# Patient Record
Sex: MALE | Race: WHITE | NOT HISPANIC OR LATINO | Employment: FULL TIME | URBAN - METROPOLITAN AREA
[De-identification: names, ages, dates, MRNs, and addresses within clinical notes are randomized per-mention and may not be internally consistent; named-entity substitution may affect disease eponyms.]

---

## 2020-09-08 ENCOUNTER — HOSPITAL ENCOUNTER (OUTPATIENT)
Dept: GENERAL RADIOLOGY | Facility: HOSPITAL | Age: 50
Discharge: HOME | End: 2020-09-08
Attending: PHYSICIAN ASSISTANT

## 2022-05-17 ENCOUNTER — PREP FOR SURGERY (OUTPATIENT)
Dept: OTHER | Facility: HOSPITAL | Age: 52
End: 2022-05-17

## 2022-05-17 ENCOUNTER — OFFICE VISIT (OUTPATIENT)
Dept: SURGERY | Facility: CLINIC | Age: 52
End: 2022-05-17

## 2022-05-17 VITALS — HEIGHT: 67 IN | BODY MASS INDEX: 28.88 KG/M2 | RESPIRATION RATE: 18 BRPM | HEART RATE: 91 BPM | WEIGHT: 184 LBS

## 2022-05-17 DIAGNOSIS — R19.4 CHANGE IN BOWEL HABITS: ICD-10-CM

## 2022-05-17 DIAGNOSIS — K62.5 BRBPR (BRIGHT RED BLOOD PER RECTUM): Primary | ICD-10-CM

## 2022-05-17 PROCEDURE — 99203 OFFICE O/P NEW LOW 30 MIN: CPT | Performed by: NURSE PRACTITIONER

## 2022-05-17 RX ORDER — SODIUM CHLORIDE 0.9 % (FLUSH) 0.9 %
3 SYRINGE (ML) INJECTION EVERY 12 HOURS SCHEDULED
Status: CANCELLED | OUTPATIENT
Start: 2022-05-17

## 2022-05-17 RX ORDER — POLYETHYLENE GLYCOL 3350 17 G/17G
POWDER, FOR SOLUTION ORAL
Qty: 238 PACKET | Refills: 0 | Status: ON HOLD | OUTPATIENT
Start: 2022-05-17 | End: 2022-08-19

## 2022-05-17 RX ORDER — SODIUM CHLORIDE 0.9 % (FLUSH) 0.9 %
10 SYRINGE (ML) INJECTION AS NEEDED
Status: CANCELLED | OUTPATIENT
Start: 2022-05-17

## 2022-05-17 RX ORDER — OMEPRAZOLE 40 MG/1
40 CAPSULE, DELAYED RELEASE ORAL DAILY
COMMUNITY
Start: 2022-04-02

## 2022-08-19 ENCOUNTER — HOSPITAL ENCOUNTER (OUTPATIENT)
Facility: HOSPITAL | Age: 52
Setting detail: HOSPITAL OUTPATIENT SURGERY
Discharge: HOME | End: 2022-08-19
Attending: SURGERY | Admitting: SURGERY

## 2022-08-19 ENCOUNTER — ANESTHESIA (OUTPATIENT)
Dept: GASTROENTEROLOGY | Facility: HOSPITAL | Age: 52
End: 2022-08-19

## 2022-08-19 ENCOUNTER — ANESTHESIA EVENT (OUTPATIENT)
Dept: GASTROENTEROLOGY | Facility: HOSPITAL | Age: 52
End: 2022-08-19

## 2022-08-19 VITALS
DIASTOLIC BLOOD PRESSURE: 69 MMHG | HEIGHT: 68 IN | WEIGHT: 181.22 LBS | RESPIRATION RATE: 16 BRPM | HEART RATE: 70 BPM | TEMPERATURE: 96.9 F | OXYGEN SATURATION: 97 % | SYSTOLIC BLOOD PRESSURE: 105 MMHG | BODY MASS INDEX: 27.47 KG/M2

## 2022-08-19 DIAGNOSIS — K62.5 BRBPR (BRIGHT RED BLOOD PER RECTUM): ICD-10-CM

## 2022-08-19 DIAGNOSIS — R19.4 CHANGE IN BOWEL HABITS: ICD-10-CM

## 2022-08-19 PROCEDURE — 88305 TISSUE EXAM BY PATHOLOGIST: CPT | Performed by: SURGERY

## 2022-08-19 PROCEDURE — 25010000002 PROPOFOL 10 MG/ML EMULSION: Performed by: NURSE ANESTHETIST, CERTIFIED REGISTERED

## 2022-08-19 RX ORDER — SODIUM CHLORIDE 0.9 % (FLUSH) 0.9 %
3 SYRINGE (ML) INJECTION EVERY 12 HOURS SCHEDULED
Status: DISCONTINUED | OUTPATIENT
Start: 2022-08-19 | End: 2022-08-19 | Stop reason: HOSPADM

## 2022-08-19 RX ORDER — DEXMEDETOMIDINE HYDROCHLORIDE 100 UG/ML
INJECTION, SOLUTION INTRAVENOUS AS NEEDED
Status: DISCONTINUED | OUTPATIENT
Start: 2022-08-19 | End: 2022-08-19 | Stop reason: SURG

## 2022-08-19 RX ORDER — SODIUM CHLORIDE 0.9 % (FLUSH) 0.9 %
10 SYRINGE (ML) INJECTION AS NEEDED
Status: DISCONTINUED | OUTPATIENT
Start: 2022-08-19 | End: 2022-08-19 | Stop reason: HOSPADM

## 2022-08-19 RX ORDER — SODIUM CHLORIDE, SODIUM LACTATE, POTASSIUM CHLORIDE, CALCIUM CHLORIDE 600; 310; 30; 20 MG/100ML; MG/100ML; MG/100ML; MG/100ML
30 INJECTION, SOLUTION INTRAVENOUS CONTINUOUS
Status: DISCONTINUED | OUTPATIENT
Start: 2022-08-19 | End: 2022-08-19 | Stop reason: HOSPADM

## 2022-08-19 RX ORDER — LIDOCAINE HYDROCHLORIDE 20 MG/ML
INJECTION, SOLUTION EPIDURAL; INFILTRATION; INTRACAUDAL; PERINEURAL AS NEEDED
Status: DISCONTINUED | OUTPATIENT
Start: 2022-08-19 | End: 2022-08-19 | Stop reason: SURG

## 2022-08-19 RX ORDER — PROPOFOL 10 MG/ML
VIAL (ML) INTRAVENOUS AS NEEDED
Status: DISCONTINUED | OUTPATIENT
Start: 2022-08-19 | End: 2022-08-19 | Stop reason: SURG

## 2022-08-19 RX ADMIN — PROPOFOL 200 MCG/KG/MIN: 10 INJECTION, EMULSION INTRAVENOUS at 09:17

## 2022-08-19 RX ADMIN — LIDOCAINE HYDROCHLORIDE 100 MG: 20 INJECTION, SOLUTION EPIDURAL; INFILTRATION; INTRACAUDAL; PERINEURAL at 09:17

## 2022-08-19 RX ADMIN — DEXMEDETOMIDINE HYDROCHLORIDE 20 MCG: 100 INJECTION, SOLUTION, CONCENTRATE INTRAVENOUS at 09:24

## 2022-08-19 RX ADMIN — PROPOFOL 250 MCG/KG/MIN: 10 INJECTION, EMULSION INTRAVENOUS at 09:30

## 2022-08-19 RX ADMIN — PROPOFOL 250 MG: 10 INJECTION, EMULSION INTRAVENOUS at 09:17

## 2022-08-19 RX ADMIN — SODIUM CHLORIDE, POTASSIUM CHLORIDE, SODIUM LACTATE AND CALCIUM CHLORIDE 30 ML/HR: 600; 310; 30; 20 INJECTION, SOLUTION INTRAVENOUS at 08:22

## 2022-08-22 LAB
CYTO UR: NORMAL
LAB AP CASE REPORT: NORMAL
LAB AP CLINICAL INFORMATION: NORMAL
PATH REPORT.FINAL DX SPEC: NORMAL
PATH REPORT.GROSS SPEC: NORMAL

## 2022-08-26 ENCOUNTER — TELEPHONE (OUTPATIENT)
Dept: SURGERY | Facility: CLINIC | Age: 52
End: 2022-08-26

## 2023-09-25 ENCOUNTER — TRANSCRIBE ORDERS (OUTPATIENT)
Dept: DIABETES SERVICES | Facility: HOSPITAL | Age: 53
End: 2023-09-25

## 2023-09-25 DIAGNOSIS — E11.65 TYPE 2 DIABETES MELLITUS WITH HYPERGLYCEMIA, WITHOUT LONG-TERM CURRENT USE OF INSULIN: Primary | ICD-10-CM

## 2025-06-03 ENCOUNTER — APPOINTMENT (OUTPATIENT)
Dept: GENERAL RADIOLOGY | Facility: HOSPITAL | Age: 55
End: 2025-06-03
Payer: OTHER GOVERNMENT

## 2025-06-03 ENCOUNTER — APPOINTMENT (OUTPATIENT)
Dept: CARDIOLOGY | Facility: HOSPITAL | Age: 55
End: 2025-06-03
Payer: OTHER GOVERNMENT

## 2025-06-03 ENCOUNTER — APPOINTMENT (OUTPATIENT)
Dept: CT IMAGING | Facility: HOSPITAL | Age: 55
End: 2025-06-03
Payer: OTHER GOVERNMENT

## 2025-06-03 ENCOUNTER — HOSPITAL ENCOUNTER (INPATIENT)
Facility: HOSPITAL | Age: 55
LOS: 7 days | Discharge: HOME | End: 2025-06-10
Attending: EMERGENCY MEDICINE | Admitting: STUDENT IN AN ORGANIZED HEALTH CARE EDUCATION/TRAINING PROGRAM
Payer: OTHER GOVERNMENT

## 2025-06-03 DIAGNOSIS — J96.01 ACUTE HYPOXIC RESPIRATORY FAILURE: ICD-10-CM

## 2025-06-03 DIAGNOSIS — I21.9 TYPE 1 MYOCARDIAL INFARCTION: ICD-10-CM

## 2025-06-03 DIAGNOSIS — I21.4 NSTEMI (NON-ST ELEVATED MYOCARDIAL INFARCTION): ICD-10-CM

## 2025-06-03 DIAGNOSIS — R26.2 DIFFICULTY WALKING: ICD-10-CM

## 2025-06-03 DIAGNOSIS — I50.9 ACUTE ON CHRONIC CONGESTIVE HEART FAILURE, UNSPECIFIED HEART FAILURE TYPE: ICD-10-CM

## 2025-06-03 DIAGNOSIS — I50.21 ACUTE SYSTOLIC CONGESTIVE HEART FAILURE: Primary | ICD-10-CM

## 2025-06-03 PROBLEM — I50.31 ACUTE DIASTOLIC CONGESTIVE HEART FAILURE: Status: ACTIVE | Noted: 2025-06-03

## 2025-06-03 LAB
ALBUMIN SERPL-MCNC: 4.5 G/DL (ref 3.5–5.2)
ALBUMIN/GLOB SERPL: 1.5 G/DL
ALP SERPL-CCNC: 109 U/L (ref 39–117)
ALT SERPL W P-5'-P-CCNC: 14 U/L (ref 1–41)
ANION GAP SERPL CALCULATED.3IONS-SCNC: 17.6 MMOL/L (ref 5–15)
ANISOCYTOSIS BLD QL: NORMAL
AORTIC DIMENSIONLESS INDEX: 0.6 (DI)
APTT PPP: 110.9 SECONDS (ref 78–95.9)
APTT PPP: 37.2 SECONDS (ref 78–95.9)
APTT PPP: 56.6 SECONDS (ref 78–95.9)
APTT PPP: 69.6 SECONDS (ref 78–95.9)
APTT PPP: 80.7 SECONDS (ref 78–95.9)
ARTERIAL PATENCY WRIST A: POSITIVE
AST SERPL-CCNC: 22 U/L (ref 1–40)
ATMOSPHERIC PRESS: 743.7 MMHG
AV MEAN PRESS GRAD SYS DOP V1V2: 6 MMHG
AV VMAX SYS DOP: 163.6 CM/SEC
BASE EXCESS BLDA CALC-SCNC: -7 MMOL/L (ref -2–2)
BASOPHILS # BLD AUTO: 0.09 10*3/MM3 (ref 0–0.2)
BASOPHILS NFR BLD AUTO: 0.6 % (ref 0–1.5)
BDY SITE: ABNORMAL
BH CV ECHO MEAS - AO MAX PG: 10.7 MMHG
BH CV ECHO MEAS - AO ROOT DIAM: 2.9 CM
BH CV ECHO MEAS - AO V2 VTI: 26.6 CM
BH CV ECHO MEAS - AVA(I,D): 1.87 CM2
BH CV ECHO MEAS - EDV(MOD-SP2): 209.3 ML
BH CV ECHO MEAS - EDV(MOD-SP4): 178.7 ML
BH CV ECHO MEAS - EF(MOD-SP2): 29.7 %
BH CV ECHO MEAS - EF(MOD-SP4): 18.8 %
BH CV ECHO MEAS - ESV(MOD-SP2): 147.2 ML
BH CV ECHO MEAS - ESV(MOD-SP4): 145.1 ML
BH CV ECHO MEAS - IVS/LVPW: 0.91 CM
BH CV ECHO MEAS - IVSD: 1 CM
BH CV ECHO MEAS - LA DIMENSION: 4.9 CM
BH CV ECHO MEAS - LAT PEAK E' VEL: 11.3 CM/SEC
BH CV ECHO MEAS - LV DIASTOLIC VOL/BSA (35-75): 93.2 CM2
BH CV ECHO MEAS - LV MAX PG: 2.6 MMHG
BH CV ECHO MEAS - LV MEAN PG: 1.2 MMHG
BH CV ECHO MEAS - LV SYSTOLIC VOL/BSA (12-30): 75.6 CM2
BH CV ECHO MEAS - LV V1 MAX: 80 CM/SEC
BH CV ECHO MEAS - LV V1 VTI: 15.9 CM
BH CV ECHO MEAS - LVIDD: 5.9 CM
BH CV ECHO MEAS - LVIDS: 5.1 CM
BH CV ECHO MEAS - LVOT AREA: 3.1 CM2
BH CV ECHO MEAS - LVOT DIAM: 2 CM
BH CV ECHO MEAS - LVPWD: 1.1 CM
BH CV ECHO MEAS - MED PEAK E' VEL: 9.2 CM/SEC
BH CV ECHO MEAS - MR MAX PG: 85.7 MMHG
BH CV ECHO MEAS - MR MAX VEL: 463 CM/SEC
BH CV ECHO MEAS - MR MEAN PG: 55.8 MMHG
BH CV ECHO MEAS - MR VTI: 133.9 CM
BH CV ECHO MEAS - MV A MAX VEL: 66 CM/SEC
BH CV ECHO MEAS - MV DEC SLOPE: 677 CM/SEC2
BH CV ECHO MEAS - MV E MAX VEL: 133.1 CM/SEC
BH CV ECHO MEAS - MV E/A: 2.02
BH CV ECHO MEAS - MV MAX PG: 9.1 MMHG
BH CV ECHO MEAS - MV MEAN PG: 3.8 MMHG
BH CV ECHO MEAS - MV P1/2T: 66 MSEC
BH CV ECHO MEAS - MV V2 VTI: 36 CM
BH CV ECHO MEAS - MVA(P1/2T): 3.3 CM2
BH CV ECHO MEAS - MVA(VTI): 1.38 CM2
BH CV ECHO MEAS - RAP SYSTOLE: 3 MMHG
BH CV ECHO MEAS - RVDD: 2.7 CM
BH CV ECHO MEAS - RVSP: 24.7 MMHG
BH CV ECHO MEAS - SV(LVOT): 49.8 ML
BH CV ECHO MEAS - SV(MOD-SP2): 62.1 ML
BH CV ECHO MEAS - SV(MOD-SP4): 33.6 ML
BH CV ECHO MEAS - SVI(LVOT): 26 ML/M2
BH CV ECHO MEAS - SVI(MOD-SP2): 32.4 ML/M2
BH CV ECHO MEAS - SVI(MOD-SP4): 17.5 ML/M2
BH CV ECHO MEAS - TR MAX PG: 21.7 MMHG
BH CV ECHO MEAS - TR MAX VEL: 232.7 CM/SEC
BH CV ECHO MEASUREMENTS AVERAGE E/E' RATIO: 12.99
BILIRUB SERPL-MCNC: 0.7 MG/DL (ref 0–1.2)
BUN SERPL-MCNC: 14.5 MG/DL (ref 6–20)
BUN/CREAT SERPL: 14.8 (ref 7–25)
CA-I BLDA-SCNC: 1.14 MMOL/L (ref 1.13–1.32)
CALCIUM SPEC-SCNC: 9.3 MG/DL (ref 8.6–10.5)
CHLORIDE BLDA-SCNC: 109 MMOL/L (ref 98–107)
CHLORIDE SERPL-SCNC: 103 MMOL/L (ref 98–107)
CHOLEST SERPL-MCNC: 123 MG/DL (ref 0–200)
CO2 SERPL-SCNC: 17.4 MMOL/L (ref 22–29)
CREAT SERPL-MCNC: 0.98 MG/DL (ref 0.76–1.27)
D-LACTATE SERPL-SCNC: 2 MMOL/L (ref 0.5–2)
D-LACTATE SERPL-SCNC: 2.5 MMOL/L (ref 0.5–2)
D-LACTATE SERPL-SCNC: 2.6 MMOL/L
DEPRECATED RDW RBC AUTO: 49.1 FL (ref 37–54)
EGFRCR SERPLBLD CKD-EPI 2021: 92.2 ML/MIN/1.73
ELLIPTOCYTES BLD QL SMEAR: NORMAL
EOSINOPHIL # BLD AUTO: 0.17 10*3/MM3 (ref 0–0.4)
EOSINOPHIL NFR BLD AUTO: 1.1 % (ref 0.3–6.2)
ERYTHROCYTE [DISTWIDTH] IN BLOOD BY AUTOMATED COUNT: 20.1 % (ref 12.3–15.4)
FLUAV RNA RESP QL NAA+PROBE: NOT DETECTED
FLUBV RNA RESP QL NAA+PROBE: NOT DETECTED
GEN 5 1HR TROPONIN T REFLEX: 264 NG/L
GLOBULIN UR ELPH-MCNC: 3 GM/DL
GLUCOSE BLDC GLUCOMTR-MCNC: 197 MG/DL (ref 70–99)
GLUCOSE BLDC GLUCOMTR-MCNC: 201 MG/DL (ref 70–99)
GLUCOSE BLDC GLUCOMTR-MCNC: 268 MG/DL (ref 70–99)
GLUCOSE BLDC GLUCOMTR-MCNC: 287 MG/DL (ref 70–99)
GLUCOSE SERPL-MCNC: 171 MG/DL (ref 65–99)
HBA1C MFR BLD: 5.6 % (ref 4.8–5.6)
HCO3 BLDA-SCNC: 17.5 MMOL/L (ref 22–26)
HCT VFR BLD AUTO: 35.6 % (ref 37.5–51)
HCT VFR BLD CALC: 33 % (ref 38–51)
HDLC SERPL-MCNC: 29 MG/DL (ref 40–60)
HEMODILUTION: NO
HGB BLD-MCNC: 10.2 G/DL (ref 13–17.7)
HGB BLDA-MCNC: 11.2 G/DL (ref 12–18)
HOLD SPECIMEN: NORMAL
IMM GRANULOCYTES # BLD AUTO: 0.06 10*3/MM3 (ref 0–0.05)
IMM GRANULOCYTES NFR BLD AUTO: 0.4 % (ref 0–0.5)
INHALED O2 CONCENTRATION: 35 %
INR PPP: 1.29 (ref 0.86–1.15)
LDLC SERPL CALC-MCNC: 72 MG/DL (ref 0–100)
LDLC/HDLC SERPL: 2.43 {RATIO}
LEFT ATRIUM VOLUME INDEX: 56.7 ML/M2
LV EF BIPLANE MOD: 25 %
LYMPHOCYTES # BLD AUTO: 2.92 10*3/MM3 (ref 0.7–3.1)
LYMPHOCYTES NFR BLD AUTO: 18.4 % (ref 19.6–45.3)
MCH RBC QN AUTO: 20 PG (ref 26.6–33)
MCHC RBC AUTO-ENTMCNC: 28.7 G/DL (ref 31.5–35.7)
MCV RBC AUTO: 69.8 FL (ref 79–97)
MICROCYTES BLD QL: NORMAL
MODALITY: ABNORMAL
MONOCYTES # BLD AUTO: 0.89 10*3/MM3 (ref 0.1–0.9)
MONOCYTES NFR BLD AUTO: 5.6 % (ref 5–12)
NEUTROPHILS NFR BLD AUTO: 11.78 10*3/MM3 (ref 1.7–7)
NEUTROPHILS NFR BLD AUTO: 73.9 % (ref 42.7–76)
NRBC BLD AUTO-RTO: 0 /100 WBC (ref 0–0.2)
NT-PROBNP SERPL-MCNC: 3045 PG/ML (ref 0–900)
OVALOCYTES BLD QL SMEAR: NORMAL
PCO2 BLDA: 31.3 MM HG (ref 35–45)
PEEP RESPIRATORY: 8 CM[H2O]
PH BLDA: 7.36 PH UNITS (ref 7.35–7.45)
PLATELET # BLD AUTO: 224 10*3/MM3 (ref 140–450)
PMV BLD AUTO: ABNORMAL FL
PO2 BLD: 306 MM[HG] (ref 0–500)
PO2 BLDA: 107 MM HG (ref 80–100)
POTASSIUM BLDA-SCNC: 3.4 MMOL/L (ref 3.5–5)
POTASSIUM SERPL-SCNC: 3.8 MMOL/L (ref 3.5–5.2)
PROCALCITONIN SERPL-MCNC: 0.07 NG/ML (ref 0–0.25)
PROT SERPL-MCNC: 7.5 G/DL (ref 6–8.5)
PROTHROMBIN TIME: 16.6 SECONDS (ref 11.8–14.9)
PSV: 16 CMH2O
RBC # BLD AUTO: 5.1 10*6/MM3 (ref 4.14–5.8)
RSV RNA RESP QL NAA+PROBE: NOT DETECTED
SAO2 % BLDCOA: 98 % (ref 95–99)
SARS-COV-2 RNA RESP QL NAA+PROBE: NOT DETECTED
SMALL PLATELETS BLD QL SMEAR: ADEQUATE
SODIUM BLD-SCNC: 137 MMOL/L (ref 131–143)
SODIUM SERPL-SCNC: 138 MMOL/L (ref 136–145)
TRIGL SERPL-MCNC: 117 MG/DL (ref 0–150)
TROPONIN T % DELTA: -8
TROPONIN T NUMERIC DELTA: -23 NG/L
TROPONIN T SERPL HS-MCNC: 287 NG/L
VLDLC SERPL-MCNC: 22 MG/DL (ref 5–40)
WBC MORPH BLD: NORMAL
WBC NRBC COR # BLD AUTO: 15.91 10*3/MM3 (ref 3.4–10.8)
WHOLE BLOOD HOLD COAG: NORMAL
WHOLE BLOOD HOLD SPECIMEN: NORMAL
WHOLE BLOOD HOLD SPECIMEN: NORMAL

## 2025-06-03 PROCEDURE — 85025 COMPLETE CBC W/AUTO DIFF WBC: CPT | Performed by: EMERGENCY MEDICINE

## 2025-06-03 PROCEDURE — 36415 COLL VENOUS BLD VENIPUNCTURE: CPT | Performed by: EMERGENCY MEDICINE

## 2025-06-03 PROCEDURE — 83605 ASSAY OF LACTIC ACID: CPT | Performed by: EMERGENCY MEDICINE

## 2025-06-03 PROCEDURE — 94664 DEMO&/EVAL PT USE INHALER: CPT

## 2025-06-03 PROCEDURE — 94799 UNLISTED PULMONARY SVC/PX: CPT

## 2025-06-03 PROCEDURE — 83036 HEMOGLOBIN GLYCOSYLATED A1C: CPT | Performed by: STUDENT IN AN ORGANIZED HEALTH CARE EDUCATION/TRAINING PROGRAM

## 2025-06-03 PROCEDURE — 94640 AIRWAY INHALATION TREATMENT: CPT

## 2025-06-03 PROCEDURE — 71045 X-RAY EXAM CHEST 1 VIEW: CPT

## 2025-06-03 PROCEDURE — 80051 ELECTROLYTE PANEL: CPT

## 2025-06-03 PROCEDURE — 82948 REAGENT STRIP/BLOOD GLUCOSE: CPT

## 2025-06-03 PROCEDURE — 93306 TTE W/DOPPLER COMPLETE: CPT

## 2025-06-03 PROCEDURE — 85007 BL SMEAR W/DIFF WBC COUNT: CPT | Performed by: EMERGENCY MEDICINE

## 2025-06-03 PROCEDURE — 93306 TTE W/DOPPLER COMPLETE: CPT | Performed by: INTERNAL MEDICINE

## 2025-06-03 PROCEDURE — 83880 ASSAY OF NATRIURETIC PEPTIDE: CPT | Performed by: EMERGENCY MEDICINE

## 2025-06-03 PROCEDURE — 25010000002 METHYLPREDNISOLONE PER 125 MG: Performed by: EMERGENCY MEDICINE

## 2025-06-03 PROCEDURE — 83605 ASSAY OF LACTIC ACID: CPT

## 2025-06-03 PROCEDURE — 93005 ELECTROCARDIOGRAM TRACING: CPT | Performed by: STUDENT IN AN ORGANIZED HEALTH CARE EDUCATION/TRAINING PROGRAM

## 2025-06-03 PROCEDURE — 99222 1ST HOSP IP/OBS MODERATE 55: CPT | Performed by: STUDENT IN AN ORGANIZED HEALTH CARE EDUCATION/TRAINING PROGRAM

## 2025-06-03 PROCEDURE — 71275 CT ANGIOGRAPHY CHEST: CPT

## 2025-06-03 PROCEDURE — 93010 ELECTROCARDIOGRAM REPORT: CPT | Performed by: INTERNAL MEDICINE

## 2025-06-03 PROCEDURE — 25010000002 HEPARIN (PORCINE) 25000-0.45 UT/250ML-% SOLUTION: Performed by: EMERGENCY MEDICINE

## 2025-06-03 PROCEDURE — 84145 PROCALCITONIN (PCT): CPT | Performed by: NURSE PRACTITIONER

## 2025-06-03 PROCEDURE — 87040 BLOOD CULTURE FOR BACTERIA: CPT | Performed by: EMERGENCY MEDICINE

## 2025-06-03 PROCEDURE — 25010000002 FUROSEMIDE PER 20 MG: Performed by: INTERNAL MEDICINE

## 2025-06-03 PROCEDURE — 99291 CRITICAL CARE FIRST HOUR: CPT

## 2025-06-03 PROCEDURE — 82948 REAGENT STRIP/BLOOD GLUCOSE: CPT | Performed by: STUDENT IN AN ORGANIZED HEALTH CARE EDUCATION/TRAINING PROGRAM

## 2025-06-03 PROCEDURE — 94660 CPAP INITIATION&MGMT: CPT

## 2025-06-03 PROCEDURE — 87637 SARSCOV2&INF A&B&RSV AMP PRB: CPT | Performed by: EMERGENCY MEDICINE

## 2025-06-03 PROCEDURE — 82803 BLOOD GASES ANY COMBINATION: CPT

## 2025-06-03 PROCEDURE — 25010000002 FUROSEMIDE PER 20 MG: Performed by: NURSE PRACTITIONER

## 2025-06-03 PROCEDURE — 80053 COMPREHEN METABOLIC PANEL: CPT | Performed by: EMERGENCY MEDICINE

## 2025-06-03 PROCEDURE — 85730 THROMBOPLASTIN TIME PARTIAL: CPT | Performed by: STUDENT IN AN ORGANIZED HEALTH CARE EDUCATION/TRAINING PROGRAM

## 2025-06-03 PROCEDURE — 94761 N-INVAS EAR/PLS OXIMETRY MLT: CPT

## 2025-06-03 PROCEDURE — 93005 ELECTROCARDIOGRAM TRACING: CPT | Performed by: EMERGENCY MEDICINE

## 2025-06-03 PROCEDURE — 82330 ASSAY OF CALCIUM: CPT

## 2025-06-03 PROCEDURE — 99222 1ST HOSP IP/OBS MODERATE 55: CPT | Performed by: INTERNAL MEDICINE

## 2025-06-03 PROCEDURE — 80061 LIPID PANEL: CPT | Performed by: STUDENT IN AN ORGANIZED HEALTH CARE EDUCATION/TRAINING PROGRAM

## 2025-06-03 PROCEDURE — 99223 1ST HOSP IP/OBS HIGH 75: CPT | Performed by: STUDENT IN AN ORGANIZED HEALTH CARE EDUCATION/TRAINING PROGRAM

## 2025-06-03 PROCEDURE — 25010000002 HEPARIN (PORCINE) 25000-0.45 UT/250ML-% SOLUTION: Performed by: STUDENT IN AN ORGANIZED HEALTH CARE EDUCATION/TRAINING PROGRAM

## 2025-06-03 PROCEDURE — 63710000001 INSULIN REGULAR HUMAN PER 5 UNITS: Performed by: STUDENT IN AN ORGANIZED HEALTH CARE EDUCATION/TRAINING PROGRAM

## 2025-06-03 PROCEDURE — 85730 THROMBOPLASTIN TIME PARTIAL: CPT | Performed by: EMERGENCY MEDICINE

## 2025-06-03 PROCEDURE — 93005 ELECTROCARDIOGRAM TRACING: CPT

## 2025-06-03 PROCEDURE — 36600 WITHDRAWAL OF ARTERIAL BLOOD: CPT

## 2025-06-03 PROCEDURE — 85610 PROTHROMBIN TIME: CPT | Performed by: EMERGENCY MEDICINE

## 2025-06-03 PROCEDURE — 25510000001 IOPAMIDOL PER 1 ML: Performed by: EMERGENCY MEDICINE

## 2025-06-03 PROCEDURE — 84484 ASSAY OF TROPONIN QUANT: CPT | Performed by: EMERGENCY MEDICINE

## 2025-06-03 PROCEDURE — 25010000002 FUROSEMIDE PER 20 MG: Performed by: EMERGENCY MEDICINE

## 2025-06-03 RX ORDER — IPRATROPIUM BROMIDE AND ALBUTEROL SULFATE 2.5; .5 MG/3ML; MG/3ML
3 SOLUTION RESPIRATORY (INHALATION) ONCE
Status: COMPLETED | OUTPATIENT
Start: 2025-06-03 | End: 2025-06-03

## 2025-06-03 RX ORDER — SODIUM CHLORIDE 9 MG/ML
40 INJECTION, SOLUTION INTRAVENOUS AS NEEDED
Status: DISCONTINUED | OUTPATIENT
Start: 2025-06-03 | End: 2025-06-10 | Stop reason: HOSPADM

## 2025-06-03 RX ORDER — POLYETHYLENE GLYCOL 3350 17 G/17G
17 POWDER, FOR SOLUTION ORAL DAILY PRN
Status: DISCONTINUED | OUTPATIENT
Start: 2025-06-03 | End: 2025-06-10 | Stop reason: HOSPADM

## 2025-06-03 RX ORDER — BISACODYL 10 MG
10 SUPPOSITORY, RECTAL RECTAL DAILY PRN
Status: DISCONTINUED | OUTPATIENT
Start: 2025-06-03 | End: 2025-06-10 | Stop reason: HOSPADM

## 2025-06-03 RX ORDER — TIRZEPATIDE 5 MG/.5ML
0.5 INJECTION, SOLUTION SUBCUTANEOUS
COMMUNITY
Start: 2025-04-08

## 2025-06-03 RX ORDER — ASPIRIN 81 MG/1
81 TABLET ORAL DAILY
Status: DISCONTINUED | OUTPATIENT
Start: 2025-06-03 | End: 2025-06-09 | Stop reason: SDUPTHER

## 2025-06-03 RX ORDER — ROSUVASTATIN CALCIUM 5 MG/1
5 TABLET, COATED ORAL DAILY
Status: DISCONTINUED | OUTPATIENT
Start: 2025-06-03 | End: 2025-06-06

## 2025-06-03 RX ORDER — POTASSIUM CHLORIDE 750 MG/1
40 CAPSULE, EXTENDED RELEASE ORAL ONCE
Status: COMPLETED | OUTPATIENT
Start: 2025-06-03 | End: 2025-06-03

## 2025-06-03 RX ORDER — SODIUM CHLORIDE 0.9 % (FLUSH) 0.9 %
10 SYRINGE (ML) INJECTION AS NEEDED
Status: DISCONTINUED | OUTPATIENT
Start: 2025-06-03 | End: 2025-06-10 | Stop reason: HOSPADM

## 2025-06-03 RX ORDER — PREDNISONE 20 MG/1
40 TABLET ORAL DAILY
Status: DISCONTINUED | OUTPATIENT
Start: 2025-06-04 | End: 2025-06-07

## 2025-06-03 RX ORDER — IOPAMIDOL 755 MG/ML
100 INJECTION, SOLUTION INTRAVASCULAR
Status: COMPLETED | OUTPATIENT
Start: 2025-06-03 | End: 2025-06-03

## 2025-06-03 RX ORDER — AMOXICILLIN 250 MG
2 CAPSULE ORAL 2 TIMES DAILY PRN
Status: DISCONTINUED | OUTPATIENT
Start: 2025-06-03 | End: 2025-06-10 | Stop reason: HOSPADM

## 2025-06-03 RX ORDER — FUROSEMIDE 10 MG/ML
40 INJECTION INTRAMUSCULAR; INTRAVENOUS ONCE
Status: COMPLETED | OUTPATIENT
Start: 2025-06-03 | End: 2025-06-03

## 2025-06-03 RX ORDER — FUROSEMIDE 10 MG/ML
40 INJECTION INTRAMUSCULAR; INTRAVENOUS EVERY 12 HOURS
Status: DISCONTINUED | OUTPATIENT
Start: 2025-06-03 | End: 2025-06-03

## 2025-06-03 RX ORDER — GLIMEPIRIDE 4 MG/1
4 TABLET ORAL 2 TIMES DAILY
COMMUNITY
Start: 2025-04-17

## 2025-06-03 RX ORDER — IBUPROFEN 600 MG/1
1 TABLET ORAL
Status: DISCONTINUED | OUTPATIENT
Start: 2025-06-03 | End: 2025-06-10 | Stop reason: HOSPADM

## 2025-06-03 RX ORDER — DEXTROSE MONOHYDRATE 25 G/50ML
25 INJECTION, SOLUTION INTRAVENOUS
Status: DISCONTINUED | OUTPATIENT
Start: 2025-06-03 | End: 2025-06-10 | Stop reason: HOSPADM

## 2025-06-03 RX ORDER — AMOXICILLIN 500 MG/1
1 CAPSULE ORAL 3 TIMES DAILY
COMMUNITY
Start: 2025-05-21 | End: 2025-06-10 | Stop reason: HOSPADM

## 2025-06-03 RX ORDER — NICOTINE POLACRILEX 4 MG
15 LOZENGE BUCCAL
Status: DISCONTINUED | OUTPATIENT
Start: 2025-06-03 | End: 2025-06-10 | Stop reason: HOSPADM

## 2025-06-03 RX ORDER — LISINOPRIL 10 MG/1
10 TABLET ORAL DAILY
COMMUNITY
Start: 2025-01-21

## 2025-06-03 RX ORDER — SPIRONOLACTONE 25 MG/1
25 TABLET ORAL DAILY
Status: DISCONTINUED | OUTPATIENT
Start: 2025-06-03 | End: 2025-06-10 | Stop reason: HOSPADM

## 2025-06-03 RX ORDER — BUDESONIDE 0.5 MG/2ML
0.5 INHALANT ORAL
Status: DISCONTINUED | OUTPATIENT
Start: 2025-06-03 | End: 2025-06-10 | Stop reason: HOSPADM

## 2025-06-03 RX ORDER — HEPARIN SODIUM 10000 [USP'U]/100ML
12 INJECTION, SOLUTION INTRAVENOUS
Status: DISCONTINUED | OUTPATIENT
Start: 2025-06-03 | End: 2025-06-09

## 2025-06-03 RX ORDER — NITROGLYCERIN 0.4 MG/1
0.4 TABLET SUBLINGUAL
Status: DISCONTINUED | OUTPATIENT
Start: 2025-06-03 | End: 2025-06-05 | Stop reason: SDUPTHER

## 2025-06-03 RX ORDER — IPRATROPIUM BROMIDE AND ALBUTEROL SULFATE 2.5; .5 MG/3ML; MG/3ML
3 SOLUTION RESPIRATORY (INHALATION) EVERY 6 HOURS PRN
Status: DISCONTINUED | OUTPATIENT
Start: 2025-06-03 | End: 2025-06-10 | Stop reason: HOSPADM

## 2025-06-03 RX ORDER — ROSUVASTATIN CALCIUM 5 MG/1
5 TABLET, COATED ORAL DAILY
COMMUNITY
Start: 2025-01-21 | End: 2025-06-10 | Stop reason: HOSPADM

## 2025-06-03 RX ORDER — BISACODYL 5 MG/1
5 TABLET, DELAYED RELEASE ORAL DAILY PRN
Status: DISCONTINUED | OUTPATIENT
Start: 2025-06-03 | End: 2025-06-10 | Stop reason: HOSPADM

## 2025-06-03 RX ORDER — FUROSEMIDE 10 MG/ML
80 INJECTION INTRAMUSCULAR; INTRAVENOUS
Status: DISCONTINUED | OUTPATIENT
Start: 2025-06-03 | End: 2025-06-09

## 2025-06-03 RX ORDER — SODIUM CHLORIDE 0.9 % (FLUSH) 0.9 %
10 SYRINGE (ML) INJECTION EVERY 12 HOURS SCHEDULED
Status: DISCONTINUED | OUTPATIENT
Start: 2025-06-03 | End: 2025-06-10 | Stop reason: HOSPADM

## 2025-06-03 RX ORDER — ARFORMOTEROL TARTRATE 15 UG/2ML
15 SOLUTION RESPIRATORY (INHALATION)
Status: DISCONTINUED | OUTPATIENT
Start: 2025-06-03 | End: 2025-06-10 | Stop reason: HOSPADM

## 2025-06-03 RX ADMIN — ROSUVASTATIN CALCIUM 5 MG: 5 TABLET, FILM COATED ORAL at 09:37

## 2025-06-03 RX ADMIN — ARFORMOTEROL TARTRATE 15 MCG: 15 SOLUTION RESPIRATORY (INHALATION) at 09:31

## 2025-06-03 RX ADMIN — HEPARIN SODIUM 15 UNITS/KG/HR: 10000 INJECTION, SOLUTION INTRAVENOUS at 16:13

## 2025-06-03 RX ADMIN — IOPAMIDOL 100 ML: 755 INJECTION, SOLUTION INTRAVENOUS at 03:44

## 2025-06-03 RX ADMIN — IPRATROPIUM BROMIDE AND ALBUTEROL SULFATE 3 ML: .5; 3 SOLUTION RESPIRATORY (INHALATION) at 01:42

## 2025-06-03 RX ADMIN — METHYLPREDNISOLONE SODIUM SUCCINATE 125 MG: 125 INJECTION, POWDER, LYOPHILIZED, FOR SOLUTION INTRAMUSCULAR; INTRAVENOUS at 01:41

## 2025-06-03 RX ADMIN — BUDESONIDE 0.5 MG: 0.5 SUSPENSION RESPIRATORY (INHALATION) at 09:31

## 2025-06-03 RX ADMIN — HEPARIN SODIUM 12 UNITS/KG/HR: 10000 INJECTION, SOLUTION INTRAVENOUS at 02:49

## 2025-06-03 RX ADMIN — INSULIN HUMAN 4 UNITS: 100 INJECTION, SOLUTION PARENTERAL at 11:33

## 2025-06-03 RX ADMIN — Medication 10 ML: at 09:37

## 2025-06-03 RX ADMIN — SPIRONOLACTONE 25 MG: 25 TABLET ORAL at 17:24

## 2025-06-03 RX ADMIN — IPRATROPIUM BROMIDE AND ALBUTEROL SULFATE 3 ML: .5; 3 SOLUTION RESPIRATORY (INHALATION) at 01:41

## 2025-06-03 RX ADMIN — INSULIN HUMAN 3 UNITS: 100 INJECTION, SOLUTION PARENTERAL at 06:36

## 2025-06-03 RX ADMIN — FUROSEMIDE 40 MG: 10 INJECTION, SOLUTION INTRAMUSCULAR; INTRAVENOUS at 02:35

## 2025-06-03 RX ADMIN — EMPAGLIFLOZIN 10 MG: 10 TABLET, FILM COATED ORAL at 17:24

## 2025-06-03 RX ADMIN — POTASSIUM CHLORIDE 40 MEQ: 750 CAPSULE, EXTENDED RELEASE ORAL at 09:37

## 2025-06-03 RX ADMIN — FUROSEMIDE 80 MG: 10 INJECTION, SOLUTION INTRAMUSCULAR; INTRAVENOUS at 17:24

## 2025-06-03 RX ADMIN — ASPIRIN 81 MG: 81 TABLET, COATED ORAL at 09:37

## 2025-06-03 RX ADMIN — FUROSEMIDE 40 MG: 10 INJECTION, SOLUTION INTRAMUSCULAR; INTRAVENOUS at 09:38

## 2025-06-03 RX ADMIN — INSULIN HUMAN 2 UNITS: 100 INJECTION, SOLUTION PARENTERAL at 17:50

## 2025-06-03 NOTE — PROGRESS NOTES
RT EQUIPMENT DEVICE RELATED - SKIN ASSESSMENT    RT Medical Equipment/Device:     NIV Mask:  Under-the-nose   size:  B    Skin Assessment:      Cheek:  Intact  Neck:  Intact  Nose:  Intact  Mouth:  Intact    Device Skin Pressure Protection:  Positioning supports utilized and Skin-to-device areas padded:  None Required    Nurse Notification:  Fernanda Mayer, RRT

## 2025-06-03 NOTE — Clinical Note
First balloon inflation max pressure = 16 keyshawn. First balloon inflation duration = 12 seconds. Second inflation of balloon - Max pressure = 18 keyshawn. 2nd Inflation of balloon - Duration = 15 seconds.

## 2025-06-03 NOTE — PLAN OF CARE
Goal Outcome Evaluation:      Patient is alert and oriented x4.  VSS.  Patient had ECHO today and is still on heparin drip. Patient is not longer on continuous bipap he is on room air.  No acute changes this shift.  NO signs of distress noted at this time.

## 2025-06-03 NOTE — H&P
[facility]  Date: 6/3/2025   Patient Name: Kash Huff  : 1970  MRN: [id number]  Primary Care Physician:  Brock Davison PA  Date of admission: 6/3/2025    Subjective   Subjective     Chief Complaint: Shortness of breath    HPI:    Kash Huff is a 55 y.o. male with past medical history of hypertension, hyperlipidemia, type 2 diabetes mellitus presented to the ED for evaluation of shortness of breath started 2 days ago, is been progressively worsening.  Associated with dry cough.  Denies any fevers, chills,  nausea, vomiting.  No known history of COPD.  Tonight prior to coming to the ED patient started having anterior chest pain radiating to the back.  Current active smoking history.  Does not use any inhalers at home.  Denies any calf pain, unilateral leg swelling, recent travel history.    On arrival, vital signs temperature 97.7, pulse 114, respiratory 30, blood pressure 156/107, on 2 L saturating at 100%.  ABG 7.3 , troponin 287, proBNP 3045, bicarb 17.4, anion gap 17.6, lactic acid 2.5, rest of the CMP with no significant findings, WBC 15.91, hemoglobin 10.2, platelets 224.  EKG showed normal sinus rhythm with no significant ST-T wave changes.  Chest x-ray showed bilateral infiltrates.  findings represent pulmonary edema with vascular congestion.  Infectious multifocal pneumonia cannot be excluded.  There are suspected small bilateral pleural effusions.  Received Lasix, nebulizer treatments and Solu-Medrol.  Patient admitted for further evaluation and management of NSTEMI, acute hypoxic respiratory failure, pulmonary edema    Personal History     Past Medical History:   Diagnosis Date    Heart burn          Past Surgical History:   Procedure Laterality Date    COLONOSCOPY N/A 2022    Procedure: COLONOSCOPY with biopsy;  Surgeon: Dheeraj Camacho MD;  Location: [facility] ENDOSCOPY;  Service: General;  Laterality: N/A;  colon polyp     FINGER SURGERY           Family History   Problem Relation Age of Onset    Alcohol abuse Neg Hx     Malig Hyperthermia Neg Hx          Social History     Socioeconomic History    Marital status:    Tobacco Use    Smoking status: Every Day     Current packs/day: 1.00     Types: Cigarettes    Smokeless tobacco: Never   Vaping Use    Vaping status: Never Used   Substance and Sexual Activity    Alcohol use: Yes     Alcohol/week: 3.0 standard drinks of alcohol     Types: 3 Cans of beer per week    Drug use: Never    Sexual activity: Defer         Home Medications:  Tirzepatide, glimepiride, lisinopril, omeprazole, and rosuvastatin    Allergies:  No Known Allergies      Objective   Objective     Vitals:   Temp:  [97.5 °F (36.4 °C)-97.9 °F (36.6 °C)] 97.9 °F (36.6 °C)  Heart Rate:  [] 98  Resp:  [30-45] 45  BP: (123-156)/() 143/84  Flow (L/min) (Oxygen Therapy):  [2] 2    Physical Exam    Constitutional: Awake, alert, no acute distress   Eyes: Pupils equal, sclerae anicteric, no conjunctival injection   HENT: NCAT, mucous membranes moist   Respiratory: Clear to auscultation bilaterally, nonlabored respirations    Cardiovascular: RRR, no murmurs   Gastrointestinal: Positive bowel sounds, soft, nontender, nondistended   Musculoskeletal: No bilateral ankle edema, no clubbing or cyanosis to extremities   Neurologic: Oriented x 3,  speech clear   Skin: No rashes     Result Review    Result Review:  I have personally reviewed the results from the time of this admission to 6/3/2025 05:17 EDT and agree with these findings:  [x]  Laboratory  []  Microbiology  [x]  Radiology  [x]  EKG/Telemetry   []  Cardiology/Vascular   []  Pathology  []  Old records  []  Other:        Imaging Results (Last 24 Hours)       Procedure Component Value Units Date/Time    CT Angiogram Chest Pulmonary Embolism [[id number]] Collected: 06/03/25 0354     Updated: 06/03/25 0414    Narrative:      CT ANGIOGRAM CHEST PULMONARY EMBOLISM-    "  Date of exam: 6/3/2025 3:44 AM.     Comparison: 6/3/2025.     INDICATIONS:   55-year-old male w/ possible pulmonary embolism; h/o SOA/SOB/shortness  of air/shortness of breath (x 3 days).     TECHNIQUE:  Axial CT images were obtained of the chest after the uneventful  intravenous administration of 80 mL (or less) of Isovue-370 nonionic  contrast agent. Reconstructed 2D coronal and sagittal images were also  obtained. Automated exposure control and iterative construction methods  were used. Additionally, the radiation dose reduction device was turned  on for each scan per the ALARA (As Low as Reasonably Achievable)  protocol. Please see the electronic medical record, EMR (i.e., Epic),  for the documented radiation dose. As of the time of this  interpretation, no 3D \"radial range\" reformation (and/or other type[s]  of 3D reformations) is (are) provided for review. Please see the EMR  (i.e., [employer]) for the documented dose of intravenous contrast agent as  well as the radiation dose.     FINDINGS:  No pulmonary embolism is seen. Bilateral nodular groundglass opacities  are identified and are nonspecific. They measure less than 3 cm in  greatest axial diameter. For instance, one of the largest such findings  involves the lower lobe of the left lung and measures measures about 2.3  cm, as seen on image 34 of series 5, image 111 of series 7, image 176 of  series 6, and adjacent images. No cavitation or mineralization is  associated with the finding. These findings may represent an  age-indeterminate infectious/inflammatory process. In the differential  diagnosis would be septic emboli. Bilateral lower lobe atelectasis  and/or pneumonia is (are) suspected, as well. Superimposed mild  pulmonary edema with vascular congestion is possible. There are  moderate-to-large bilateral pleural effusions. Borderline cardiac  enlargement is suspected. The left ventricle appears dilated. A trace  amount of pericardial effusion is seen. " Atherosclerotic changes are  noted, including involvement of the coronary arteries. Chronic calcified  granulomatous disease involves the chest. Grossly, the central  tracheobronchial tree is well aerated without filling defect. There may  be small-to-moderate probably reactive noncalcified mediastinal and  hilar lymph nodes. No acute fracture. No aggressive osseous lesion.  Please note that the entire inferior extent of the ribs is not included  in the field-of-view. The upper abdomen is obscured by motion artifact.  Grossly, no acute findings are seen.          Impression:      1.  No pulmonary embolism.   2.  No thoracic aortic aneurysm or dissection.   3.  Moderate-to-large bilateral pleural effusions are seen.   4.  There is borderline cardiac enlargement. The left ventricle appears  dilated.   5.  There are coronary artery calcifications.   6.  Bilateral nodular groundglass opacities are noted and are  nonspecific. Infectious/inflammatory process is possible, as detailed  above.   7.  Superimposed mild pulmonary edema is suspected.   8.  Bibasilar pneumonia and/or atelectasis is (are) suggested.   9.  Please see above comments for further detail.        Portions of this note were completed with a voice recognition program.     6/3/2025 4:12 AM by Sudarshan Valderrama MD on Workstation: [id number]       XR Chest 1 View [[id number]] Collected: 06/03/25 0149     Updated: 06/03/25 0154    Narrative:      XR CHEST 1 VW-     Date of exam: 6/3/2025 1:47 AM.     Comparison: None available.     INDICATIONS:  55-year-old male w/ h/o SOA/SOB/shortness of air/shortness of breath;  trouble breathing.     FINDINGS:  A single AP (or PA) upright portable view of the chest is provided for  review. There is pulmonary hypoinflation. Bilateral infiltrates are  seen. The findings may represent pulmonary edema with vascular  congestion. Infectious multifocal pneumonia cannot be excluded. Probably  no cardiomediastinal enlargement. No  pneumothorax. No pneumomediastinum.  Small bilateral pleural effusions are suggested. Chronic calcified  granulomatous disease involves the chest. External artifacts obscure  detail.          Impression:      Bilateral infiltrates are seen. The findings may represent pulmonary  edema with vascular congestion. Infectious multifocal pneumonia cannot  be excluded. There are suspected small bilateral pleural effusions.  Please see above comments for further detail.           Portions of this note were completed with a voice recognition program.     6/3/2025 1:52 AM by Sudarshan Valderrama MD on Workstation: [employer]                arformoterol, 15 mcg, Nebulization, BID - RT  aspirin, 81 mg, Oral, Daily  budesonide, 0.5 mg, Nebulization, BID - RT  insulin regular, 2-7 Units, Subcutaneous, Q6H  [START ON 6/4/2025] predniSONE, 40 mg, Oral, Daily  rosuvastatin, 5 mg, Oral, Daily  sodium chloride, 10 mL, Intravenous, Q12H         Assessment & Plan   Assessment / Plan     Assessment/Plan:   Acute hypoxic respiratory failure  Pulmonary edema  Moderate to large bilateral pleural effusions  Concern for new CHF  NSTEMI with likely type II due to respiratory distress and tachycardia  Possible COPD exacerbation  Hypertension  Hyperlipidemia  Type 2 diabetes mellitus    Plan  Admit to inpatient, telemetry  Continue Bipap  Continuous pulse ox  Received 40 mg IV Lasix, reassess and redose in the a.m.  Cardiac echo  Continue heparin infusion until evaluation by cardiology   Cardiology consult in the a.m. Dr. Wilian Ybarra  Pulmonology consult in the a.m. Dr. Menendez for management of bilateral large pleural effusion  Follow-up on A1c, lipid panel  Aspirin 81 mg daily  Continue rosuvastatin  Brovana, Pulmicort  Bronchodilator protocol  DuoNebs every 6 hours as needed  Prednisone 40 mg p.o. daily for 4 days  N.p.o. except sips with meds  Hypoglycemia protocol  Low-dose sliding scale insulin  POC glucose every 6 hours  Resume other appropriate home  medications once reconciled      VTE Prophylaxis:  Pharmacologic & mechanical VTE prophylaxis orders are present.    CODE STATUS:    Code Status (Patient has no pulse and is not breathing): CPR (Attempt to Resuscitate)  Medical Interventions (Patient has pulse or is breathing): Full Support  Level Of Support Discussed With: Patient      Admission Status:  I believe this patient meets inpatient status.    Part of this note may be an electronic transcription/translation of spoken language to printed text using the Dragon Dictation System    Umer Cortes MD

## 2025-06-03 NOTE — PLAN OF CARE
Goal Outcome Evaluation:  Plan of Care Reviewed With: patient        Progress: no change  Outcome Evaluation: Admitted from the ED for acute hypoxic respiratory failure. A&Ox4. Vital signs stable with pt on BiPap, o2 sats maintaining in the 90s. NPO maintained. No complaints of pain. Admission completed. No questions or concerns noted at this time. Plan of care ongoing.

## 2025-06-03 NOTE — PLAN OF CARE
Goal Outcome Evaluation:           Progress: no change  Outcome Evaluation: Pt on continuous BiPAP 16/8 & 21%. Pt very tachypnic on BiPAP, when BiPAP is removed, pt becomes even more tachypnic. Pt receiving txs in-line through the BiPAP.

## 2025-06-03 NOTE — THERAPY EVALUATION
RT EQUIPMENT DEVICE RELATED - SKIN ASSESSMENT    RT Medical Equipment/Device:     NIV Mask:  Under-the-nose   size:  b    Skin Assessment:      Cheek:  Intact  Chin:  Intact  Ears:  Intact  Nares:  Intact  Neck:  Intact  Mouth:  Intact    Device Skin Pressure Protection:  Pressure points protected    Nurse Notification:  Fernanda Maharaj, CHRISTIAN

## 2025-06-03 NOTE — Clinical Note
Hemostasis started on the right brachial artery. Manual pressure applied to vessel. Manual pressure was held by Quentin. Manual pressure was held for 10 min. Hemostasis achieved successfully.

## 2025-06-03 NOTE — Clinical Note
First balloon inflation max pressure = 14 keyshawn. First balloon inflation duration = 10 seconds. Second inflation of balloon - Max pressure = 16 keyshawn. 2nd Inflation of balloon - Duration = 10 seconds. The balloon is positioned in the Proximal segment of the vessel. Third inflation of balloon - Max pressure = 16 keyshawn. 3rd Inflation of balloon - Duration = 12 seconds. Fourth inflation of balloon - Max pressure = 18 keyshawn. 4th Inflation of balloo n - Duration = 12 seconds.

## 2025-06-03 NOTE — Clinical Note
First balloon inflation max pressure = 12 keyshawn. First balloon inflation duration = 12 seconds. Second inflation of balloon - Max pressure = 16 keyshawn. 2nd Inflation of balloon - Duration = 15 seconds.

## 2025-06-03 NOTE — Clinical Note
First balloon inflation max pressure = 14 keyshawn. First balloon inflation duration = 10 seconds. Second inflation of balloon - Max pressure = 16 keyshawn. 2nd Inflation of balloon - Duration = 10 seconds.

## 2025-06-03 NOTE — CONSULTS
[facility]   Cardiology Consult Note    Patient Name: Kash Huff  : 1970  MRN: [id number]  Primary Care Physician:  Brock Davison PA  Referring Physician: No ref. provider found    Date of admission: 6/3/2025    Subjective   Subjective     Reason for Consultation : Heart Failure Symptoms.     Chief Complaint : Progressive Shortness of breath and leg edema    HPI:  Kash Huff is a 55 y.o. male with past medical history significant of essential hypertension, Type 2 Diabetes Mellitus who presented to the Emergency Room with progressive and worsening dyspnea for the last few days. He also reports orthopnea and PND. He denies angina, palpitations,fever or chills. He reports some transient and brief episode of chest tightness radiated to the back but at the time of this evaluation he denies pain. A CTA of the chest showed no evidence of pulmonary embolism or evidence of dissection but showed cardiomegaly and coronary calcifications.     Review of Systems   All systems were reviewed and negative except for: dyspnea .    Personal History     Past Medical History:   Diagnosis Date    Heart burn               Family History: family history is not on file. Otherwise pertinent FHx was reviewed and not pertinent to current issue.    Social History:  reports that he has been smoking cigarettes. He has never used smokeless tobacco. He reports current alcohol use of about 3.0 standard drinks of alcohol per week. He reports that he does not use drugs.    Home Medications:  Tirzepatide, amoxicillin, glimepiride, lisinopril, omeprazole, and rosuvastatin    Allergies:  No Known Allergies    Objective    Objective     Vitals:   Temp:  [97.5 °F (36.4 °C)-97.9 °F (36.6 °C)] 97.8 °F (36.6 °C)  Heart Rate:  [] 88  Resp:  [20-45] 20  BP: (123-156)/() 141/76  Flow (L/min) (Oxygen Therapy):  [2] 2      Physical Exam:   Constitutional: Awake, alert, No acute distress    Eyes: PERRLA, sclerae  anicteric, no conjunctival injection   HENT: NCAT, mucous membranes moist   Neck: Supple, no thyromegaly, no lymphadenopathy, trachea midline   Respiratory: Clear to auscultation bilaterally, nonlabored respirations    Cardiovascular: RRR, no murmurs, rubs, or gallops, palpable pedal pulses bilaterally   Gastrointestinal: Positive bowel sounds, soft, nontender, nondistended   Musculoskeletal: No bilateral ankle edema, no clubbing or cyanosis to extremities   Psychiatric: Appropriate affect, cooperative   Neurologic: Oriented x 3, strength symmetric in all extremities, Cranial Nerves grossly intact to confrontation, speech clear   Skin: No rashes     Result Review    Result Review:  I have personally reviewed the results from the time of this admission to 6/3/2025 15:10 EDT and agree with these findings:  [x]  Laboratory  []  Microbiology  [x]  Radiology  [x]  EKG/Telemetry   [x]  Cardiology/Vascular   []  Pathology  [x]  Old records  []  Other:  Most notable findings include:     CMP          6/3/2025    01:30   CMP   Glucose 171    BUN 14.5    Creatinine 0.98    EGFR 92.2    Sodium 138    Potassium 3.8    Chloride 103    Calcium 9.3    Total Protein 7.5    Albumin 4.5    Globulin 3.0    Total Bilirubin 0.7    Alkaline Phosphatase 109    AST (SGOT) 22    ALT (SGPT) 14    Albumin/Globulin Ratio 1.5    BUN/Creatinine Ratio 14.8    Anion Gap 17.6       CBC          6/3/2025    01:30   CBC   WBC 15.91    RBC 5.10    Hemoglobin 10.2    Hematocrit 35.6    MCV 69.8    MCH 20.0    MCHC 28.7    RDW 20.1    Platelets 224       Lab Results   Component Value Date    TROPONINT 264 (C) 06/03/2025         Assessment & Plan   Assessment / Plan     Brief Patient Summary:  Kash Huff is a 55 y.o. male who has a history of hypertension, mixed hyperlipidemia and possible COPD.  He has coronary calcification and CTA of the chest done yesterday.  The patient presented with signs and symptoms of acute heart failure and elevated  troponin suggestive of non-ST ovation myocardial infarction.  The troponins were flat and may be due to heart failure in this patient without angina.     Active Hospital Problems:  Active Hospital Problems    Diagnosis     **Acute hypoxic respiratory failure     Acute diastolic congestive heart failure     NSTEMI (non-ST elevated myocardial infarction)          Plan:     I would proceed with optimal guideline directed medical therapy with with diuresis for goal output over 3.5 to 4 L/day.  I would initiate Aldactone, Jardiance , beta-blockers and angiotensin receptor blockers as tolerated.  Renal function potassium levels.  Continue with anticoagulation for least 24 to 48 hours.  Consider low-dose aspirin and continue statin therapy.  We reviewed the 2D echo to assess wall motion and cardiac function.  If the ejection fraction is normal, we will proceed with a pharmacological stress test once the patient is stable to evaluate for presence of ischemia.  Ejection fraction is reduced, will consider right and left heart catheterization once the patient is stable.     Electronically signed by Daniel Ybarra MD, 06/03/25, 11:51 AM EDT.      The 2 DECHO showed severely reduced EF with global HK and mild to moderate eccentric MR. Once the patient is optimize and better volume status, will proceed with right and left heart catheterization.

## 2025-06-03 NOTE — ED PROVIDER NOTES
Time: 1:56 AM EDT  Date of encounter:  6/3/2025  Independent Historian/Clinical History and Information was obtained by:   Patient    History is limited by: N/A    Chief Complaint: shortness of breath      History of Present Illness:  Patient is a 55 y.o. year old male who presents to the emergency department for evaluation of Shortness of breath.  Patient states symptoms are 2 days ago.  He reports symptoms have been getting progressively worse.  He does report a nonproductive cough.  No known fever.  Denies any known history of COPD or CHF.  He does have a history of smoking.  He reports chest pain with deep inspiration.      Patient Care Team  Primary Care Provider: Brock Davison PA    Past Medical History:     No Known Allergies  Past Medical History:   Diagnosis Date    Heart burn      Past Surgical History:   Procedure Laterality Date    COLONOSCOPY N/A 8/19/2022    Procedure: COLONOSCOPY with biopsy;  Surgeon: Dheeraj Camacho MD;  Location: [facility] ENDOSCOPY;  Service: General;  Laterality: N/A;  colon polyp    FINGER SURGERY       Family History   Problem Relation Age of Onset    Alcohol abuse Neg Hx     Malig Hyperthermia Neg Hx        Home Medications:  Prior to Admission medications    Medication Sig Start Date End Date Taking? Authorizing Provider   glimepiride (AMARYL) 4 MG tablet Take 1 tablet by mouth 2 (Two) Times a Day. 4/17/25  Yes Marcos Stratton MD   lisinopril (PRINIVIL,ZESTRIL) 10 MG tablet Take 1 tablet by mouth Daily. 1/21/25  Yes Marcos Stratton MD   rosuvastatin (CRESTOR) 5 MG tablet Take 1 tablet by mouth Daily. 1/21/25  Yes Marcos Stratton MD   Tirzepatide (Mounjaro) 5 MG/0.5ML solution auto-injector INJECT 0.5 ML INTO THE SKIN EVERY 7 DAYS 4/8/25  Yes Marcos Stratton MD   omeprazole (priLOSEC) 40 MG capsule Take 40 mg by mouth Daily. 4/2/22   Marcos Stratton MD        Social History:   Social History     Tobacco Use    Smoking status: Every Day  "    Current packs/day: 1.00     Types: Cigarettes    Smokeless tobacco: Never   Vaping Use    Vaping status: Never Used   Substance Use Topics    Alcohol use: Yes     Alcohol/week: 3.0 standard drinks of alcohol     Types: 3 Cans of beer per week    Drug use: Never         Review of Systems:  Review of Systems   Constitutional:  Negative for chills and fever.   HENT:  Negative for congestion, ear pain and sore throat.    Eyes:  Negative for pain.   Respiratory:  Positive for cough, shortness of breath and wheezing. Negative for chest tightness.    Cardiovascular:  Negative for chest pain.   Gastrointestinal:  Negative for abdominal pain, diarrhea, nausea and vomiting.   Genitourinary:  Negative for flank pain and hematuria.   Musculoskeletal:  Negative for joint swelling.   Skin:  Negative for pallor.   Neurological:  Negative for seizures and headaches.   All other systems reviewed and are negative.       Physical Exam:  /84 (BP Location: Right arm, Patient Position: Lying)   Pulse 98   Temp 97.9 °F (36.6 °C) (Oral)   Resp (!) 45   Ht 170.2 cm (67\")   Wt 80.4 kg (177 lb 4 oz)   SpO2 99%   BMI 27.76 kg/m²     Physical Exam  Constitutional:       General: He is in acute distress.   HENT:      Head: Normocephalic.   Eyes:      Extraocular Movements: Extraocular movements intact.   Cardiovascular:      Rate and Rhythm: Regular rhythm. Tachycardia present.   Pulmonary:      Effort: Tachypnea and respiratory distress present.      Breath sounds: Decreased breath sounds and wheezing present.   Chest:      Chest wall: No tenderness.   Abdominal:      Palpations: Abdomen is soft.      Tenderness: There is no abdominal tenderness.   Musculoskeletal:         General: Normal range of motion.      Cervical back: Normal range of motion.      Right lower leg: No edema.      Left lower leg: No edema.   Skin:     General: Skin is warm.      Capillary Refill: Capillary refill takes less than 2 seconds.   Neurological: "      General: No focal deficit present.      Mental Status: He is oriented to person, place, and time.                    Medical Decision Making:      Comorbidities that affect care:    GERD    External Notes reviewed:    Previous Clinic Note: Patient was seen by general surgery for bright red blood per rectum and change in bowel habits.      The following orders were placed and all results were independently analyzed by me:  Orders Placed This Encounter   Procedures    Blood Culture - Blood,    Blood Culture - Blood,    COVID-19, FLU A/B, RSV PCR 1 HR TAT - Swab, Nasopharynx    XR Chest 1 View    CT Angiogram Chest Pulmonary Embolism    [place] Draw    Comprehensive Metabolic Panel    BNP    High Sensitivity Troponin T    CBC Auto Differential    Lactic Acid, Plasma    Scan Slide    Arterial Blood Gas, Lytes, Lactate    Arterial Blood Gas,H&H,Lytes,Lactate    STAT Lactic Acid, Reflex    Blood Gas, Arterial -    High Sensitivity Troponin T 1Hr    Protime-INR    aPTT    aPTT    aPTT    Hemoglobin A1c    Lipid Panel    NPO Diet NPO Type: Strict NPO    Undress & Gown    Vital Signs    Undress & Gown    Vital Signs    Notify Provider Platelet Count Less Than 35590    Notify Provider if PTT Not in Therapeutic Range After 24 Hours    Stop Infusion & Notify Provider if Bleeding Occurs    RN To Release aPTT Order 6 Hours After Heparin Bolus & 6 Hours After Any Heparin Rate Change    After 2 Consecutive Therapeutic aPTTs, Obtain aPTT Daily.  If a Rate Adjustment is Necessary, Resume Every 6 Hour aPTT Draws    Vital Signs    Intake & Output    Weigh Patient    Oral Care    Place Sequential Compression Device    Maintain Sequential Compression Device    Maintain IV Access    Telemetry - Place Orders & Notify Provider of Results When Patient Experiences Acute Chest Pain, Dysrhythmia or Respiratory Distress    Continuous Pulse Oximetry    Strict Intake & Output    Daily Weights    Code Status and Medical Interventions: CPR  (Attempt to Resuscitate); Full Support    Inpatient Hospitalist Consult    Inpatient Cardiology Consult    Oxygen Therapy- Nasal Cannula; Titrate 1-6 LPM Per SpO2; 90 - 95%    Oxygen Therapy- Nasal Cannula; Titrate 1-6 LPM Per SpO2; 90 - 95%    NIPPV-IPPV / BIPAP / CPAP    RT to Initiate Bronchodilator Protocol    POC Glucose Once    POC Lactate    POC Electrolyte Panel    POC Glucose Q6H    ECG 12 Lead ED Triage Standing Order; SOA    ECG 12 Lead Dyspnea    Adult Transthoracic Echo Complete W/ Cont if Necessary Per Protocol    Insert Peripheral IV    Insert Peripheral IV    Insert Peripheral IV    Inpatient Admission    CBC & Differential    Green Top (Gel)    Lavender Top    Gold Top - SST    Light Blue Top    CBC & Differential    Extra Tubes    Lavender Top    Green Top (Gel)       Medications Given in the Emergency Department:  Medications   sodium chloride 0.9 % flush 10 mL (has no administration in time range)   sodium chloride 0.9 % flush 10 mL (has no administration in time range)   heparin 47580 units/250 mL (100 units/mL) in 0.45 % NaCl infusion (12 Units/kg/hr × 80.5 kg Intravenous Currently Infusing 6/3/25 0258)   heparin bolus from bag solution 4,000 Units (has no administration in time range)   heparin bolus from bag solution 2,000 Units (has no administration in time range)   sodium chloride 0.9 % flush 10 mL (has no administration in time range)   sodium chloride 0.9 % flush 10 mL (has no administration in time range)   sodium chloride 0.9 % infusion 40 mL (has no administration in time range)   nitroglycerin (NITROSTAT) SL tablet 0.4 mg (has no administration in time range)   sennosides-docusate (PERICOLACE) 8.6-50 MG per tablet 2 tablet (has no administration in time range)     And   polyethylene glycol (MIRALAX) packet 17 g (has no administration in time range)     And   bisacodyl (DULCOLAX) EC tablet 5 mg (has no administration in time range)     And   bisacodyl (DULCOLAX) suppository 10 mg (has  no administration in time range)   arformoterol (BROVANA) nebulizer solution 15 mcg (has no administration in time range)   budesonide (PULMICORT) nebulizer solution 0.5 mg (has no administration in time range)   ipratropium-albuterol (DUO-NEB) nebulizer solution 3 mL (has no administration in time range)   aspirin EC tablet 81 mg (has no administration in time range)   rosuvastatin (CRESTOR) tablet 5 mg (has no administration in time range)   predniSONE (DELTASONE) tablet 40 mg (has no administration in time range)   dextrose (GLUTOSE) oral gel 15 g (has no administration in time range)   dextrose (D50W) (25 g/50 mL) IV injection 25 g (has no administration in time range)   glucagon (GLUCAGEN) injection 1 mg (has no administration in time range)   insulin regular (humuLIN R,novoLIN R) injection 2-7 Units (has no administration in time range)   ipratropium-albuterol (DUO-NEB) nebulizer solution 3 mL (3 mL Nebulization Given 6/3/25 0141)   ipratropium-albuterol (DUO-NEB) nebulizer solution 3 mL (3 mL Nebulization Given 6/3/25 0142)   methylPREDNISolone sodium succinate (SOLU-Medrol) 125 mg in sterile water (preservative free) 2 mL (125 mg Intravenous Given 6/3/25 0141)   furosemide (LASIX) injection 40 mg (40 mg Intravenous Given 6/3/25 0235)   heparin bolus from bag solution 4,800 Units (4,800 Units Intravenous Bolus from Bag 6/3/25 0248)   iopamidol (ISOVUE-370) 76 % injection 100 mL (100 mL Intravenous Given 6/3/25 0344)        ED Course:    ED Course as of 06/03/25 0504   Tue Jun 03, 2025   0155 ECG 12 Lead ED Triage Standing Order; SOA  Sinus tachycardia with rate of 107.  No definite ST elevation.  Normal CA and QTc.  EKG interpreted by me [LD]      ED Course User Index  [LD] Claudia Kruger MD       Labs:    Lab Results (last 24 hours)       Procedure Component Value Units Date/Time    CBC & Differential [[id number]]  (Abnormal) Collected: 06/03/25 0130    Specimen: Blood Updated: 06/03/25 0212     Narrative:      The following orders were created for panel order CBC & Differential.  Procedure                               Abnormality         Status                     ---------                               -----------         ------                     CBC Auto Differential[[id number]]        Abnormal            Final result               Scan Slide[[id number]]                                       Final result                 Please view results for these tests on the individual orders.    Comprehensive Metabolic Panel [[id number]]  (Abnormal) Collected: 06/03/25 0130    Specimen: Blood Updated: 06/03/25 0200     Glucose 171 mg/dL      BUN 14.5 mg/dL      Creatinine 0.98 mg/dL      Sodium 138 mmol/L      Potassium 3.8 mmol/L      Comment: Slight hemolysis detected by analyzer. Result may be falsely elevated.        Chloride 103 mmol/L      CO2 17.4 mmol/L      Calcium 9.3 mg/dL      Total Protein 7.5 g/dL      Albumin 4.5 g/dL      ALT (SGPT) 14 U/L      AST (SGOT) 22 U/L      Comment: Slight hemolysis detected by analyzer. Result may be falsely elevated.        Alkaline Phosphatase 109 U/L      Total Bilirubin 0.7 mg/dL      Globulin 3.0 gm/dL      A/G Ratio 1.5 g/dL      BUN/Creatinine Ratio 14.8     Anion Gap 17.6 mmol/L      eGFR 92.2 mL/min/1.73     Narrative:      GFR Categories in Chronic Kidney Disease (CKD)              GFR Category          GFR (mL/min/1.73)    Interpretation  G1                    90 or greater        Normal or high (1)  G2                    60-89                Mild decrease (1)  G3a                   45-59                Mild to moderate decrease  G3b                   30-44                Moderate to severe decrease  G4                    15-29                Severe decrease  G5                    14 or less           Kidney failure    (1)In the absence of evidence of kidney disease, neither GFR category G1 or G2 fulfill the criteria for CKD.    eGFR calculation 2021 CKD-EPI  creatinine equation, which does not include race as a factor    BNP [[id number]]  (Abnormal) Collected: 06/03/25 0130    Specimen: Blood Updated: 06/03/25 0157     proBNP 3,045.0 pg/mL     Narrative:      This assay is used as an aid in the diagnosis of individuals suspected of having heart failure. It can be used as an aid in the diagnosis of acute decompensated heart failure (ADHF) in patients presenting with signs and symptoms of ADHF to the emergency department (ED). In addition, NT-proBNP of <300 pg/mL indicates ADHF is not likely.    Age Range Result Interpretation  NT-proBNP Concentration (pg/mL:      <50             Positive            >450                   Gray                 300-450                    Negative             <300    50-75           Positive            >900                  Gray                300-900                  Negative            <300      >75             Positive            >1800                  Gray                300-1800                  Negative            <300    High Sensitivity Troponin T [[id number]]  (Abnormal) Collected: 06/03/25 0130    Specimen: Blood Updated: 06/03/25 0215     HS Troponin T 287 ng/L     Narrative:      High Sensitive Troponin T Reference Range:  <14.0 ng/L- Negative Female for AMI  <22.0 ng/L- Negative Male for AMI  >=14 - Abnormal Female indicating possible myocardial injury.  >=22 - Abnormal Male indicating possible myocardial injury.   Clinicians would have to utilize clinical acumen, EKG, Troponin, and serial changes to determine if it is an Acute Myocardial Infarction or myocardial injury due to an underlying chronic condition.         CBC Auto Differential [[id number]]  (Abnormal) Collected: 06/03/25 0130    Specimen: Blood Updated: 06/03/25 0212     WBC 15.91 10*3/mm3      RBC 5.10 10*6/mm3      Hemoglobin 10.2 g/dL      Hematocrit 35.6 %      MCV 69.8 fL      MCH 20.0 pg      MCHC 28.7 g/dL      RDW 20.1 %      RDW-SD 49.1 fl      MPV --      Comment: Unable to result due to specimen interference        Platelets 224 10*3/mm3      Neutrophil % 73.9 %      Lymphocyte % 18.4 %      Monocyte % 5.6 %      Eosinophil % 1.1 %      Basophil % 0.6 %      Immature Grans % 0.4 %      Neutrophils, Absolute 11.78 10*3/mm3      Lymphocytes, Absolute 2.92 10*3/mm3      Monocytes, Absolute 0.89 10*3/mm3      Eosinophils, Absolute 0.17 10*3/mm3      Basophils, Absolute 0.09 10*3/mm3      Immature Grans, Absolute 0.06 10*3/mm3      nRBC 0.0 /100 WBC     Narrative:      Appended report. These results have been appended to a previously verified report.    Lactic Acid, Plasma [[id number]]  (Abnormal) Collected: 06/03/25 0130    Specimen: Blood Updated: 06/03/25 0215     Lactate 2.5 mmol/L     Blood Culture - Blood, Arm, Right [[id number]] Collected: 06/03/25 0130    Specimen: Blood from Arm, Right Updated: 06/03/25 0137    Scan Slide [[id number]] Collected: 06/03/25 0130    Specimen: Blood Updated: 06/03/25 0212     Anisocytosis Slight/1+     Elliptocytes Slight/1+     Microcytes Slight/1+     Ovalocytes Slight/1+     WBC Morphology Normal     Platelet Estimate Adequate    Protime-INR [[id number]]  (Abnormal) Collected: 06/03/25 0130    Specimen: Blood Updated: 06/03/25 0252     Protime 16.6 Seconds      INR 1.29    Narrative:      Suggested Therapeutic Ranges For Oral Anticoagulant Therapy:  Level of Therapy                      INR Target Range  Standard Dose                            2.0-3.0  High Dose                                2.5-3.5  Patients not receiving anticoagulant  Therapy Normal Range                     0.86-1.15    aPTT [[id number]]  (Abnormal) Collected: 06/03/25 0130    Specimen: Blood Updated: 06/03/25 0252     PTT 37.2 seconds     Hemoglobin A1c [[id number]] Collected: 06/03/25 0130    Specimen: Blood Updated: 06/03/25 0421    Blood Culture - Blood, Arm, Left [[id number]] Collected: 06/03/25 0131    Specimen: Blood from Arm, Left Updated: 06/03/25 0137     POC Glucose Once [[id number]]  (Abnormal) Collected: 06/03/25 0211    Specimen: Arterial Blood Updated: 06/03/25 0215     Glucose 287 mg/dL      Comment: Serial Number: [id number]:  [id number]       Blood Gas, Arterial - [[id number]]  (Abnormal) Collected: 06/03/25 0211    Specimen: Arterial Blood Updated: 06/03/25 0215     Site Left Radial     Tom's Test Positive     pH, Arterial 7.357 pH units      pCO2, Arterial 31.3 mm Hg      pO2, Arterial 107.0 mm Hg      HCO3, Arterial 17.5 mmol/L      Base Excess, Arterial -7.0 mmol/L      Comment: Serial Number: [id number]:  [id number]        O2 Saturation, Arterial 98.0 %      Hemoglobin, Blood Gas 11.2 g/dL      Hematocrit, Blood Gas 33.0 %      Barometric Pressure for Blood Gas 743.7000 mmHg      Modality BiPap     FIO2 35 %      PEEP 8     PSV 16 cmH2O      Hemodilution No     PO2/FIO2 306    POC Lactate [[id number]]  (Abnormal) Collected: 06/03/25 0211    Specimen: Arterial Blood Updated: 06/03/25 0215     Lactate 2.6 mmol/L      Comment: Serial Number: [id number]:  [id number]       POC Electrolyte Panel [[id number]]  (Abnormal) Collected: 06/03/25 0211    Specimen: Arterial Blood Updated: 06/03/25 0215     Sodium 137 mmol/L      POC Potassium 3.4 mmol/L      Chloride 109 mmol/L      Ionized Calcium 1.14 mmol/L      Comment: Serial Number: [id number]:  [id number]       High Sensitivity Troponin T 1Hr [[id number]]  (Abnormal) Collected: 06/03/25 0229    Specimen: Blood Updated: 06/03/25 0304     HS Troponin T 264 ng/L      Troponin T Numeric Delta -23 ng/L      Troponin T % Delta -8    Narrative:      High Sensitive Troponin T Reference Range:  <14.0 ng/L- Negative Female for AMI  <22.0 ng/L- Negative Male for AMI  >=14 - Abnormal Female indicating possible myocardial injury.  >=22 - Abnormal Male indicating possible myocardial injury.   Clinicians would have to utilize clinical acumen, EKG, Troponin, and serial changes to determine if it is an Acute Myocardial Infarction or  myocardial injury due to an underlying chronic condition.         Lipid Panel [[id number]]  (Abnormal) Collected: 06/03/25 0229    Specimen: Blood Updated: 06/03/25 0433     Total Cholesterol 123 mg/dL      Triglycerides 117 mg/dL      HDL Cholesterol 29 mg/dL      LDL Cholesterol  72 mg/dL      VLDL Cholesterol 22 mg/dL      LDL/HDL Ratio 2.43    Narrative:      Cholesterol Reference Ranges  (U.S. Department of Health and Human Services ATP III Classifications)    Desirable          <200 mg/dL  Borderline High    200-239 mg/dL  High Risk          >240 mg/dL      Triglyceride Reference Ranges  (U.S. Department of Health and Human Services ATP III Classifications)    Normal           <150 mg/dL  Borderline High  150-199 mg/dL  High             200-499 mg/dL  Very High        >500 mg/dL    HDL Reference Ranges  (U.S. Department of Health and Human Services ATP III Classifications)    Low     <40 mg/dl (major risk factor for CHD)  High    >60 mg/dl ('negative' risk factor for CHD)        LDL Reference Ranges  (U.S. Department of Health and Human Services ATP III Classifications)    Optimal          <100 mg/dL  Near Optimal     100-129 mg/dL  Borderline High  130-159 mg/dL  High             160-189 mg/dL  Very High        >189 mg/dL    LDL is calculated using the NIH LDL-C calculation.      COVID-19, FLU A/B, RSV PCR 1 HR TAT - Swab, Nasopharynx [[id number]]  (Normal) Collected: 06/03/25 0253    Specimen: Swab from Nasopharynx Updated: 06/03/25 0341     COVID19 Not Detected     Influenza A PCR Not Detected     Influenza B PCR Not Detected     RSV, PCR Not Detected    Narrative:      Fact sheet for providers: https://www.fda.gov/media/014513/download    Fact sheet for patients: https://www.fda.gov/media/257779/download    Test performed by PCR.    STAT Lactic Acid, Reflex [[id number]] Collected: 06/03/25 0449    Specimen: Blood from Hand, Left Updated: 06/03/25 0452    aPTT [[id number]] Collected: 06/03/25 0449    Specimen:  "Blood from Hand, Left Updated: 06/03/25 0452             Imaging:    CT Angiogram Chest Pulmonary Embolism  Result Date: 6/3/2025  CT ANGIOGRAM CHEST PULMONARY EMBOLISM-  Date of exam: 6/3/2025 3:44 AM.  Comparison: 6/3/2025.  INDICATIONS: 55-year-old male w/ possible pulmonary embolism; h/o SOA/SOB/shortness of air/shortness of breath (x 3 days).  TECHNIQUE: Axial CT images were obtained of the chest after the uneventful intravenous administration of 80 mL (or less) of Isovue-370 nonionic contrast agent. Reconstructed 2D coronal and sagittal images were also obtained. Automated exposure control and iterative construction methods were used. Additionally, the radiation dose reduction device was turned on for each scan per the ALARA (As Low as Reasonably Achievable) protocol. Please see the electronic medical record, EMR (i.e., Epic), for the documented radiation dose. As of the time of this interpretation, no 3D \"radial range\" reformation (and/or other type[s] of 3D reformations) is (are) provided for review. Please see the EMR (i.e., [facility] [employer]) for the documented dose of intravenous contrast agent as well as the radiation dose.  FINDINGS: No pulmonary embolism is seen. Bilateral nodular groundglass opacities are identified and are nonspecific. They measure less than 3 cm in greatest axial diameter. For instance, one of the largest such findings involves the lower lobe of the left lung and measures measures about 2.3 cm, as seen on image 34 of series 5, image 111 of series 7, image 176 of series 6, and adjacent images. No cavitation or mineralization is associated with the finding. These findings may represent an age-indeterminate infectious/inflammatory process. In the differential diagnosis would be septic emboli. Bilateral lower lobe atelectasis and/or pneumonia is (are) suspected, as well. Superimposed mild pulmonary edema with vascular congestion is possible. There are moderate-to-large bilateral pleural effusions. " Borderline cardiac enlargement is suspected. The left ventricle appears dilated. A trace amount of pericardial effusion is seen. Atherosclerotic changes are noted, including involvement of the coronary arteries. Chronic calcified granulomatous disease involves the chest. Grossly, the central tracheobronchial tree is well aerated without filling defect. There may be small-to-moderate probably reactive noncalcified mediastinal and hilar lymph nodes. No acute fracture. No aggressive osseous lesion. Please note that the entire inferior extent of the ribs is not included in the field-of-view. The upper abdomen is obscured by motion artifact. Grossly, no acute findings are seen.       1.  No pulmonary embolism. 2.  No thoracic aortic aneurysm or dissection. 3.  Moderate-to-large bilateral pleural effusions are seen. 4.  There is borderline cardiac enlargement. The left ventricle appears dilated. 5.  There are coronary artery calcifications. 6.  Bilateral nodular groundglass opacities are noted and are nonspecific. Infectious/inflammatory process is possible, as detailed above. 7.  Superimposed mild pulmonary edema is suspected. 8.  Bibasilar pneumonia and/or atelectasis is (are) suggested. 9.  Please see above comments for further detail.   Portions of this note were completed with a voice recognition program.  6/3/2025 4:12 AM by Sudarshan Valderrama MD on Workstation: [id number]      XR Chest 1 View  Result Date: 6/3/2025  XR CHEST 1 VW-  Date of exam: 6/3/2025 1:47 AM.  Comparison: None available.  INDICATIONS: 55-year-old male w/ h/o SOA/SOB/shortness of air/shortness of breath; trouble breathing.  FINDINGS: A single AP (or PA) upright portable view of the chest is provided for review. There is pulmonary hypoinflation. Bilateral infiltrates are seen. The findings may represent pulmonary edema with vascular congestion. Infectious multifocal pneumonia cannot be excluded. Probably no cardiomediastinal enlargement. No pneumothorax.  No pneumomediastinum. Small bilateral pleural effusions are suggested. Chronic calcified granulomatous disease involves the chest. External artifacts obscure detail.       Bilateral infiltrates are seen. The findings may represent pulmonary edema with vascular congestion. Infectious multifocal pneumonia cannot be excluded. There are suspected small bilateral pleural effusions. Please see above comments for further detail.    Portions of this note were completed with a voice recognition program.  6/3/2025 1:52 AM by Sudarshan Valderrama MD on Workstation: [id number]          Differential Diagnosis and Discussion:    Dyspnea: Differential diagnosis includes but is not limited to metabolic acidosis, neurological disorders, psychogenic, asthma, pneumothorax, upper airway obstruction, COPD, pneumonia, noncardiogenic pulmonary edema, interstitial lung disease, anemia, congestive heart failure, and pulmonary embolism    PROCEDURES:    Labs were collected in the emergency department and all labs were reviewed and interpreted by me.  X-ray were performed in the emergency department and all X-ray impressions were independently interpreted by me.  An EKG was performed and the EKG was interpreted by me.  CT scan was performed in the emergency department and the CT scan radiology impression was interpreted by me.    ECG 12 Lead ED Triage Standing Order; SOA   Preliminary Result   HEART BRKK=911  bpm   RR Vfdxdgol=096  ms   UT Dqjcetxw=312  ms   P Horizontal Axis=-8  deg   P Front Axis=40  deg   QRSD Jpyyqvtc=810  ms   QT Dpweycbc=218  ms   ZXqR=104  ms   QRS Axis=83  deg   T Wave Axis=-85  deg   - ABNORMAL ECG -   Sinus tachycardia   Probable left atrial enlargement   Borderline repolarization abnormality   Borderline ST elevation, anterior leads   Date and Time of Study:2025-06-03 01:22:05      ECG 12 Lead Dyspnea    (Results Pending)       Procedures    MDM  Number of Diagnoses or Management Options  Acute hypoxic respiratory  failure  Acute on chronic congestive heart failure, unspecified heart failure type  NSTEMI (non-ST elevated myocardial infarction)  Diagnosis management comments: On arrival patient is acute respiratory distress.  He is tachypneic.  O2 sat 90% on room air.  He has decreased breath sounds and wheezing.  Patient given nebulizer treatment as well as Solu-Medrol.  Denies any known history of COPD or CHF but does have a history of smoking.  After nebulizer treatment patient still had difficulty breathing and was placed on BiPAP.  EKG showed sinus rhythm.  No definite ST elevation.  Labs were obtained that showed elevated troponin as well as elevated BNP.  X-ray showed bilateral infiltrates.  Discussed patient with hospitalist and will admit for further care.       Amount and/or Complexity of Data Reviewed  Clinical lab tests: reviewed  Tests in the radiology section of CPT®: reviewed  Decide to obtain previous medical records or to obtain history from someone other than the patient: yes  Review and summarize past medical records: yes  Independent visualization of images, tracings, or specimens: yes    Risk of Complications, Morbidity, and/or Mortality  Presenting problems: moderate  Management options: moderate             Total Critical Care time of 45 minutes. Total critical care time documented does not include time spent on separately billed procedures for services of nurses or physician assistants. I personally saw and examined the patient. I have reviewed all diagnostic interpretations and treatment plans as written. I was present for the key portions of any procedures performed and the inclusive time noted in any critical care statement. Critical care time includes patient management by me, time spent at the patients bedside,  time to review lab and imaging results, discussing patient care, documentation in the medical record, and time spent with family or caregiver.          Patient Care  Considerations:          Consultants/Shared Management Plan:    Hospitalist: I have discussed the case with Dr Cortes who agrees to accept the patient for admission.    Social Determinants of Health:          Disposition and Care Coordination:    Admit:   Through independent evaluation of the patient's history, physical, and imperical data, the patient meets criteria for inpatient admission to the hospital.        Final diagnoses:   Acute hypoxic respiratory failure   NSTEMI (non-ST elevated myocardial infarction)   Acute on chronic congestive heart failure, unspecified heart failure type        ED Disposition       ED Disposition   Decision to Admit    Condition   --    Comment   Level of Care: Telemetry [5]  Diagnosis: Acute hypoxic respiratory failure [[id number]]  Certification: I Certify That Inpatient Hospital Services Are Medically Necessary For Greater Than 2 Midnights                 This medical record created using voice recognition software.             Claudia Kruger MD  06/03/25 0132

## 2025-06-03 NOTE — CONSULTS
Pulmonary / Critical Care Consult Note      Patient Name: Kash Huff  : 1970  MRN: [id number]  Primary Care Physician:  Brock Davison PA  Referring Physician: Leonidas Ashley MD  Date of admission: 6/3/2025    Subjective   Subjective     Reason for Consult/ Chief Complaint: Bilateral pleural effusions    HPI:  Kash Huff is a 55 y.o. male with past medical history of type 2 diabetes and hypertension who presented to the emergency department with shortness of breath x 2 days.  In the emergency department, patient was found to be tachypneic with respiratory rate in the 30s to 40s and in respiratory distress.  Patient was then placed on NIPPV to ease work of breathing.  CXR revealed bilateral infiltrates.  Findings concerning for pulmonary edema with vascular congestion.  CTA of chest was then completed revealing no PE.  Moderate to large bilateral pleural effusions seen with borderline cardiac enlargement.  Bilateral nodule groundglass opacities concerning for infectious versus inflammatory process.  Superimposed pulmonary edema suspected with bibasilar pneumonia and/or atelectasis.  Labs of note include troponin 287, lactate 2.6, potassium 3.4, WBC 15.91, hemoglobin 10.2 and proBNP 3045.  Patient was then admitted to hospitalist service and pulmonology was consulted for further evaluation of pleural effusions.      Upon exam, patient is noted to be resting in bed on NIPPV.  BiPAP was removed at time of exam and patient was able to maintain SpO2 on room air at 97%.  He reports he had been having shortness of breath over the last 2 days which was progressively worsening.  He reports associated occasional dry nonproductive cough.  Denies any fever, chills, chest pain or hemoptysis.  He is a current smoker.  Denies any history of COPD.      Personal History     Past Medical History:   Diagnosis Date    Heart burn        Past Surgical History:   Procedure Laterality Date    COLONOSCOPY N/A  8/19/2022    Procedure: COLONOSCOPY with biopsy;  Surgeon: Dheeraj Camacho MD;  Location: [facility] ENDOSCOPY;  Service: General;  Laterality: N/A;  colon polyp    FINGER SURGERY         Family History: family history is not on file. Otherwise pertinent FHx was reviewed and not pertinent to current issue.    Social History:  reports that he has been smoking cigarettes. He has never used smokeless tobacco. He reports current alcohol use of about 3.0 standard drinks of alcohol per week. He reports that he does not use drugs.    Home Medications:  Tirzepatide, glimepiride, lisinopril, omeprazole, and rosuvastatin    Allergies:  No Known Allergies    Objective    Objective     Vitals:   Temp:  [97.5 °F (36.4 °C)-97.9 °F (36.6 °C)] 97.8 °F (36.6 °C)  Heart Rate:  [] 86  Resp:  [20-45] 36  BP: (123-156)/() 128/79  Flow (L/min) (Oxygen Therapy):  [2] 2    Physical Exam:  Vital Signs Reviewed   General:  Alert, NAD. Lying in bed.    HEENT:  PERRL, EOMI.  OP  Neck:  Supple, no JVD, no thyromegaly  Chest:  good aeration, no work of breathing noted  CV: RRR, no MGR, pulses 2+, equal.  Abd:  Soft, NT, ND, + BS  EXT:  no clubbing, no cyanosis, no edema  Neuro:  A&Ox3, CN grossly intact, no focal deficits.  Skin: No rashes or lesions noted    Result Review    Result Review:  I have personally reviewed the results from the time of this admission to 6/3/2025 10:33 EDT and agree with these findings:  [x]  Laboratory  [x]  Microbiology  [x]  Radiology  [x]  EKG/Telemetry   []  Cardiology/Vascular   []  Pathology  []  Old records  []  Other:    Most notable findings include:   proBNP 3045      Lab 06/03/25  0130   WBC 15.91*   HEMOGLOBIN 10.2*   HEMATOCRIT 35.6*   PLATELETS 224   SODIUM 138   POTASSIUM 3.8   CHLORIDE 103   CO2 17.4*   BUN 14.5   CREATININE 0.98   GLUCOSE 171*   CALCIUM 9.3   TOTAL PROTEIN 7.5   ALBUMIN 4.5   GLOBULIN 3.0      CT Angiogram Chest Pulmonary Embolism  Result Date: 6/3/2025  CT ANGIOGRAM CHEST  "PULMONARY EMBOLISM-  Date of exam: 6/3/2025 3:44 AM.  Comparison: 6/3/2025.  INDICATIONS: 55-year-old male w/ possible pulmonary embolism; h/o SOA/SOB/shortness of air/shortness of breath (x 3 days).  TECHNIQUE: Axial CT images were obtained of the chest after the uneventful intravenous administration of 80 mL (or less) of Isovue-370 nonionic contrast agent. Reconstructed 2D coronal and sagittal images were also obtained. Automated exposure control and iterative construction methods were used. Additionally, the radiation dose reduction device was turned on for each scan per the ALARA (As Low as Reasonably Achievable) protocol. Please see the electronic medical record, EMR (i.e., Epic), for the documented radiation dose. As of the time of this interpretation, no 3D \"radial range\" reformation (and/or other type[s] of 3D reformations) is (are) provided for review. Please see the EMR (i.e., [employer]) for the documented dose of intravenous contrast agent as well as the radiation dose.  FINDINGS: No pulmonary embolism is seen. Bilateral nodular groundglass opacities are identified and are nonspecific. They measure less than 3 cm in greatest axial diameter. For instance, one of the largest such findings involves the lower lobe of the left lung and measures measures about 2.3 cm, as seen on image 34 of series 5, image 111 of series 7, image 176 of series 6, and adjacent images. No cavitation or mineralization is associated with the finding. These findings may represent an age-indeterminate infectious/inflammatory process. In the differential diagnosis would be septic emboli. Bilateral lower lobe atelectasis and/or pneumonia is (are) suspected, as well. Superimposed mild pulmonary edema with vascular congestion is possible. There are moderate-to-large bilateral pleural effusions. Borderline cardiac enlargement is suspected. The left ventricle appears dilated. A trace amount of pericardial effusion is seen. Atherosclerotic " changes are noted, including involvement of the coronary arteries. Chronic calcified granulomatous disease involves the chest. Grossly, the central tracheobronchial tree is well aerated without filling defect. There may be small-to-moderate probably reactive noncalcified mediastinal and hilar lymph nodes. No acute fracture. No aggressive osseous lesion. Please note that the entire inferior extent of the ribs is not included in the field-of-view. The upper abdomen is obscured by motion artifact. Grossly, no acute findings are seen.       Impression: 1.  No pulmonary embolism. 2.  No thoracic aortic aneurysm or dissection. 3.  Moderate-to-large bilateral pleural effusions are seen. 4.  There is borderline cardiac enlargement. The left ventricle appears dilated. 5.  There are coronary artery calcifications. 6.  Bilateral nodular groundglass opacities are noted and are nonspecific. Infectious/inflammatory process is possible, as detailed above. 7.  Superimposed mild pulmonary edema is suspected. 8.  Bibasilar pneumonia and/or atelectasis is (are) suggested. 9.  Please see above comments for further detail.   Portions of this note were completed with a voice recognition program.  6/3/2025 4:12 AM by Sudarshan Valderrama MD on Workstation: [email or web address]      Assessment & Plan   Assessment / Plan     Active Hospital Problems:  Active Hospital Problems    Diagnosis     **Acute hypoxic respiratory failure      Impression:  Bilateral pleural effusions  Pulmonary edema  Concern for congestive heart failure  Elevated proBNP  NSTEMI, likely type II secondary to demand ischemia  Lactic acidosis, resolved  Leukocytosis  Ongoing tobacco abuse of cigarettes    Plan:    Continue supplemental oxygen to keep SpO2 greater than 90%  Has NIPPV as needed  Chest CT showed moderate to large bilateral effusions  Patient was started on heparin drip for ACS  Start Lasix 40 mg IV daily.  Monitor electrolytes and renal function  Trend lactate until  clearance  Will check procalcitonin  Continue prednisone 40 mg daily  Can continue Brovana, Pulmicort, DuoNeb as needed  Start bronchopulmonary hygiene  Encourage I-S and flutter valve use  2D echo pending  Heparin drip on per ACS protocol.  Cardiology on board.  Will likely proceed with thoracentesis once heparin is able to be held.  Encourage mobilization out of bed chair as tolerated.    VTE Prophylaxis:  Pharmacologic & mechanical VTE prophylaxis orders are present.         Code Status and Medical Interventions: CPR (Attempt to Resuscitate); Full Support   Ordered at: 06/03/25 0330     Code Status (Patient has no pulse and is not breathing):    CPR (Attempt to Resuscitate)     Medical Interventions (Patient has pulse or is breathing):    Full Support     Level Of Support Discussed With:    Patient        Labs, imaging, notes, and medications personally reviewed.  Thank you for involving me in the care of this patient.  Electronically signed by GEETHA Borrero, 06/03/25, 1:53 PM EDT.  This patient was seen by both a physician and a NP. I, Cori Menendez MD, spent >50% of time in accordance with split shared billing. This included personally reviewing all pertinent labs, imaging, microbiology and documentation. Also discussing the case with the patient and any available family, the admitting physician and any available ancillary staff.   Electronically signed by Cori Menendez MD, 06/03/25, 3:44 PM EDT.

## 2025-06-03 NOTE — ED TRIAGE NOTES
[facility]. [facility] states it [facility]. Pt denies any hx of CHF or COPD.

## 2025-06-03 NOTE — Clinical Note
Hemostasis started on the right radial artery. R-Band was used in achieving hemostasis. Radial compression device applied to vessel. Hemostasis achieved successfully. Closure device additional comment: 10 keyshawn

## 2025-06-03 NOTE — Clinical Note
Stent balloon removed intact. [FreeTextEntry1] : This is a 44 year male who states has decreased energy levels\par States that has no stress, sleeps well\par works out 6 days a week\par eats clean\par no HTN, DM, HTN \par \par decreased sex drive \par good morning erection\par also trouble with firmness \par \par urinates frequently -- wakes 3x per night \par \par Drinks energy drinks as much as four, and tea with honey \par \par Also with decrease erection

## 2025-06-03 NOTE — Clinical Note
First balloon inflation max pressure = 10 keyshawn. First balloon inflation duration = 10 seconds. Second inflation of balloon - Max pressure = 16 keyshawn. 2nd Inflation of balloon - Duration = 10 seconds.

## 2025-06-04 PROBLEM — I50.21 ACUTE SYSTOLIC CONGESTIVE HEART FAILURE: Status: ACTIVE | Noted: 2025-06-03

## 2025-06-04 LAB
ANION GAP SERPL CALCULATED.3IONS-SCNC: 15.4 MMOL/L (ref 5–15)
APTT PPP: 68.4 SECONDS (ref 78–95.9)
APTT PPP: 75.7 SECONDS (ref 78–95.9)
APTT PPP: 98.7 SECONDS (ref 78–95.9)
BASOPHILS # BLD AUTO: 0.03 10*3/MM3 (ref 0–0.2)
BASOPHILS NFR BLD AUTO: 0.1 % (ref 0–1.5)
BUN SERPL-MCNC: 21.8 MG/DL (ref 6–20)
BUN/CREAT SERPL: 27.6 (ref 7–25)
CALCIUM SPEC-SCNC: 8.9 MG/DL (ref 8.6–10.5)
CHLORIDE SERPL-SCNC: 106 MMOL/L (ref 98–107)
CO2 SERPL-SCNC: 20.6 MMOL/L (ref 22–29)
CREAT SERPL-MCNC: 0.79 MG/DL (ref 0.76–1.27)
DEPRECATED RDW RBC AUTO: 47.2 FL (ref 37–54)
EGFRCR SERPLBLD CKD-EPI 2021: 106.2 ML/MIN/1.73
EOSINOPHIL # BLD AUTO: 0 10*3/MM3 (ref 0–0.4)
EOSINOPHIL NFR BLD AUTO: 0 % (ref 0.3–6.2)
ERYTHROCYTE [DISTWIDTH] IN BLOOD BY AUTOMATED COUNT: 19.9 % (ref 12.3–15.4)
GLUCOSE BLDC GLUCOMTR-MCNC: 121 MG/DL (ref 70–99)
GLUCOSE BLDC GLUCOMTR-MCNC: 133 MG/DL (ref 70–99)
GLUCOSE BLDC GLUCOMTR-MCNC: 194 MG/DL (ref 70–99)
GLUCOSE BLDC GLUCOMTR-MCNC: 197 MG/DL (ref 70–99)
GLUCOSE SERPL-MCNC: 117 MG/DL (ref 65–99)
HCT VFR BLD AUTO: 33.1 % (ref 37.5–51)
HGB BLD-MCNC: 9.9 G/DL (ref 13–17.7)
IMM GRANULOCYTES # BLD AUTO: 0.1 10*3/MM3 (ref 0–0.05)
IMM GRANULOCYTES NFR BLD AUTO: 0.5 % (ref 0–0.5)
LYMPHOCYTES # BLD AUTO: 2.52 10*3/MM3 (ref 0.7–3.1)
LYMPHOCYTES NFR BLD AUTO: 11.9 % (ref 19.6–45.3)
MCH RBC QN AUTO: 20.2 PG (ref 26.6–33)
MCHC RBC AUTO-ENTMCNC: 29.9 G/DL (ref 31.5–35.7)
MCV RBC AUTO: 67.6 FL (ref 79–97)
MONOCYTES # BLD AUTO: 1.33 10*3/MM3 (ref 0.1–0.9)
MONOCYTES NFR BLD AUTO: 6.3 % (ref 5–12)
NEUTROPHILS NFR BLD AUTO: 17.21 10*3/MM3 (ref 1.7–7)
NEUTROPHILS NFR BLD AUTO: 81.2 % (ref 42.7–76)
NRBC BLD AUTO-RTO: 0.1 /100 WBC (ref 0–0.2)
PLATELET # BLD AUTO: 224 10*3/MM3 (ref 140–450)
PMV BLD AUTO: ABNORMAL FL
POTASSIUM SERPL-SCNC: 3.6 MMOL/L (ref 3.5–5.2)
RBC # BLD AUTO: 4.9 10*6/MM3 (ref 4.14–5.8)
SODIUM SERPL-SCNC: 142 MMOL/L (ref 136–145)
WBC NRBC COR # BLD AUTO: 21.19 10*3/MM3 (ref 3.4–10.8)

## 2025-06-04 PROCEDURE — 82948 REAGENT STRIP/BLOOD GLUCOSE: CPT

## 2025-06-04 PROCEDURE — 80048 BASIC METABOLIC PNL TOTAL CA: CPT | Performed by: STUDENT IN AN ORGANIZED HEALTH CARE EDUCATION/TRAINING PROGRAM

## 2025-06-04 PROCEDURE — 63710000001 INSULIN REGULAR HUMAN PER 5 UNITS: Performed by: STUDENT IN AN ORGANIZED HEALTH CARE EDUCATION/TRAINING PROGRAM

## 2025-06-04 PROCEDURE — 97161 PT EVAL LOW COMPLEX 20 MIN: CPT

## 2025-06-04 PROCEDURE — 82948 REAGENT STRIP/BLOOD GLUCOSE: CPT | Performed by: STUDENT IN AN ORGANIZED HEALTH CARE EDUCATION/TRAINING PROGRAM

## 2025-06-04 PROCEDURE — 25010000002 HEPARIN (PORCINE) 25000-0.45 UT/250ML-% SOLUTION: Performed by: STUDENT IN AN ORGANIZED HEALTH CARE EDUCATION/TRAINING PROGRAM

## 2025-06-04 PROCEDURE — 85730 THROMBOPLASTIN TIME PARTIAL: CPT | Performed by: INTERNAL MEDICINE

## 2025-06-04 PROCEDURE — 99232 SBSQ HOSP IP/OBS MODERATE 35: CPT | Performed by: STUDENT IN AN ORGANIZED HEALTH CARE EDUCATION/TRAINING PROGRAM

## 2025-06-04 PROCEDURE — 99232 SBSQ HOSP IP/OBS MODERATE 35: CPT | Performed by: INTERNAL MEDICINE

## 2025-06-04 PROCEDURE — 85730 THROMBOPLASTIN TIME PARTIAL: CPT | Performed by: STUDENT IN AN ORGANIZED HEALTH CARE EDUCATION/TRAINING PROGRAM

## 2025-06-04 PROCEDURE — 63710000001 PREDNISONE PER 1 MG: Performed by: STUDENT IN AN ORGANIZED HEALTH CARE EDUCATION/TRAINING PROGRAM

## 2025-06-04 PROCEDURE — 94799 UNLISTED PULMONARY SVC/PX: CPT

## 2025-06-04 PROCEDURE — 25010000002 FUROSEMIDE PER 20 MG: Performed by: INTERNAL MEDICINE

## 2025-06-04 PROCEDURE — 85025 COMPLETE CBC W/AUTO DIFF WBC: CPT | Performed by: STUDENT IN AN ORGANIZED HEALTH CARE EDUCATION/TRAINING PROGRAM

## 2025-06-04 RX ADMIN — ROSUVASTATIN CALCIUM 5 MG: 5 TABLET, FILM COATED ORAL at 09:34

## 2025-06-04 RX ADMIN — INSULIN HUMAN 2 UNITS: 100 INJECTION, SOLUTION PARENTERAL at 18:03

## 2025-06-04 RX ADMIN — HEPARIN SODIUM 18 UNITS/KG/HR: 10000 INJECTION, SOLUTION INTRAVENOUS at 09:36

## 2025-06-04 RX ADMIN — SPIRONOLACTONE 25 MG: 25 TABLET ORAL at 09:34

## 2025-06-04 RX ADMIN — EMPAGLIFLOZIN 10 MG: 10 TABLET, FILM COATED ORAL at 10:21

## 2025-06-04 RX ADMIN — FUROSEMIDE 80 MG: 10 INJECTION, SOLUTION INTRAMUSCULAR; INTRAVENOUS at 18:03

## 2025-06-04 RX ADMIN — INSULIN HUMAN 2 UNITS: 100 INJECTION, SOLUTION PARENTERAL at 21:53

## 2025-06-04 RX ADMIN — ASPIRIN 81 MG: 81 TABLET, COATED ORAL at 09:34

## 2025-06-04 RX ADMIN — FUROSEMIDE 80 MG: 10 INJECTION, SOLUTION INTRAMUSCULAR; INTRAVENOUS at 09:34

## 2025-06-04 RX ADMIN — PREDNISONE 40 MG: 20 TABLET ORAL at 09:34

## 2025-06-04 RX ADMIN — Medication 10 ML: at 09:35

## 2025-06-04 NOTE — PLAN OF CARE
Goal Outcome Evaluation:      Patient is still on heparin drip.  NPO at midnight for cardiac catheterization in the morning.  VSS.  Next PTT at 2031.  No acute changes this shift.  NO signs of distress noted at this time

## 2025-06-04 NOTE — PLAN OF CARE
Goal Outcome Evaluation:  Plan of Care Reviewed With: patient        Progress: improving     Outcome Evaluation: A&Ox4. Vital signs stable, O2 sat remained above 90% on room air. Heparin drip maintained. No complaints of pain this shift. No questions or concerns noted at this time. Plan of care ongoing.

## 2025-06-04 NOTE — PROGRESS NOTES
[facility]   Hospitalist Progress Note  Date: 2025  Patient Name: Kash Huff  : 1970  MRN: [id number]  Date of admission: 6/3/2025  Room/Bed: [facility]      Subjective   Subjective     Chief Complaint: Shortness of breath    Summary:Kash Huff is a 55 y.o. male with hypertension, hyperlipidemia, type 2 diabetes who presents to the ED for evaluation of shortness of breath.  X-ray showed bilateral infiltrates concerning for pulmonary edema and suspected small bilateral pleural effusions.  Cardiology consulted.  Pulmonology consulted.  Patient's breathing is improved with diuresis.  Cardiology planning for heart catheterization to concern for NSTEMI.    Interval Followup:   Patient feels well.  His breathing is better.     All systems reviewed and negative except for what is outlined above.      Objective   Objective     Vitals:   Temp:  [97.7 °F (36.5 °C)-98.3 °F (36.8 °C)] 98.1 °F (36.7 °C)  Heart Rate:  [71-94] 77  Resp:  [18] 18  BP: (106-128)/(60-69) 109/60    Physical Exam   General: NAD  Cardiovascular: RRR  Pulmonary: no conversational dyspnea  Psych: Mood and affect appropriate    Result Review    Result Review:  I have personally reviewed these results:  [x]  Laboratory      Lab 25  1214 25  0542 25  2324 25  1614 25  0855 25  0449 25  0211 25  0130   WBC  --  21.19*  --   --   --   --   --  15.91*   HEMOGLOBIN  --  9.9*  --   --   --   --   --  10.2*   HEMATOCRIT  --  33.1*  --   --   --   --   --  35.6*   PLATELETS  --  224  --   --   --   --   --  224   NEUTROS ABS  --  17.21*  --   --   --   --   --  11.78*   IMMATURE GRANS (ABS)  --  0.10*  --   --   --   --   --  0.06*   LYMPHS ABS  --  2.52  --   --   --   --   --  2.92   MONOS ABS  --  1.33*  --   --   --   --   --  0.89   EOS ABS  --  0.00  --   --   --   --   --  0.17   MCV  --  67.6*  --   --   --   --   --  69.8*   PROCALCITONIN  --   --   --   --  0.07  --   --   --     LACTATE  --   --   --   --   --  2.0 2.6* 2.5*   PROTIME  --   --   --   --   --   --   --  16.6*   APTT 68.4* 75.7* 80.7   < > 56.6* 110.9*  --  37.2*    < > = values in this interval not displayed.         Lab 06/04/25  0542 06/03/25 0130   SODIUM 142 138   POTASSIUM 3.6 3.8   CHLORIDE 106 103   CO2 20.6* 17.4*   ANION GAP 15.4* 17.6*   BUN 21.8* 14.5   CREATININE 0.79 0.98   EGFR 106.2 92.2   GLUCOSE 117* 171*   CALCIUM 8.9 9.3   HEMOGLOBIN A1C  --  5.60         Lab 06/03/25 0130   TOTAL PROTEIN 7.5   ALBUMIN 4.5   GLOBULIN 3.0   ALT (SGPT) 14   AST (SGOT) 22   BILIRUBIN 0.7   ALK PHOS 109         Lab 06/03/25  0229 06/03/25 0130   PROBNP  --  3,045.0*   HSTROP T 264* 287*   PROTIME  --  16.6*   INR  --  1.29*         Lab 06/03/25 0229   CHOLESTEROL 123   LDL CHOL 72   HDL CHOL 29*   TRIGLYCERIDES 117             Lab 06/03/25  0211   PH, ARTERIAL 7.357   PCO2, ARTERIAL 31.3*   PO2 .0*   O2 SATURATION ART 98.0   FIO2 35   HCO3 ART 17.5*   BASE EXCESS ART -7.0*     Brief Urine Lab Results       None          [x]  Microbiology   Microbiology Results (last 10 days)       Procedure Component Value - Date/Time    COVID-19, FLU A/B, RSV PCR 1 HR TAT - Swab, Nasopharynx [[id number]]  (Normal) Collected: 06/03/25 0253    Lab Status: Final result Specimen: Swab from Nasopharynx Updated: 06/03/25 0341     COVID19 Not Detected     Influenza A PCR Not Detected     Influenza B PCR Not Detected     RSV, PCR Not Detected    Narrative:      Fact sheet for providers: https://www.fda.gov/media/992801/download    Fact sheet for patients: https://www.fda.gov/media/224717/download    Test performed by PCR.    Blood Culture - Blood, Arm, Left [[id number]]  (Normal) Collected: 06/03/25 0131    Lab Status: Preliminary result Specimen: Blood from Arm, Left Updated: 06/04/25 0146     Blood Culture No growth at 24 hours    Narrative:      Less than seven (7) mL's of blood was collected.  Insufficient quantity may yield false  negative results.    Blood Culture - Blood, Arm, Right [[id number]]  (Normal) Collected: 06/03/25 0130    Lab Status: Preliminary result Specimen: Blood from Arm, Right Updated: 06/04/25 0146     Blood Culture No growth at 24 hours    Narrative:      Less than seven (7) mL's of blood was collected.  Insufficient quantity may yield false negative results.          [x]  Radiology  CT Angiogram Chest Pulmonary Embolism  Result Date: 6/3/2025  1.  No pulmonary embolism. 2.  No thoracic aortic aneurysm or dissection. 3.  Moderate-to-large bilateral pleural effusions are seen. 4.  There is borderline cardiac enlargement. The left ventricle appears dilated. 5.  There are coronary artery calcifications. 6.  Bilateral nodular groundglass opacities are noted and are nonspecific. Infectious/inflammatory process is possible, as detailed above. 7.  Superimposed mild pulmonary edema is suspected. 8.  Bibasilar pneumonia and/or atelectasis is (are) suggested. 9.  Please see above comments for further detail.   Portions of this note were completed with a voice recognition program.  6/3/2025 4:12 AM by Sudarshan Valderrama MD on Workstation: [email or web address]      XR Chest 1 View  Result Date: 6/3/2025  Bilateral infiltrates are seen. The findings may represent pulmonary edema with vascular congestion. Infectious multifocal pneumonia cannot be excluded. There are suspected small bilateral pleural effusions. Please see above comments for further detail.    Portions of this note were completed with a voice recognition program.  6/3/2025 1:52 AM by Sudarshan Valderrama MD on Workstation: [email or web address]      []  EKG/Telemetry   []  Cardiology/Vascular   []  Pathology  []  Old records  []  Other:    Assessment & Plan        Assessment and Plan:    Acute hypoxic respiratory failure  Pulmonary edema  bilateral pleural effusions  Heart failure with reduced ejection fraction  NSTEMI with likely type II due to respiratory distress and tachycardia  Possible COPD  exacerbation  Hypertension  Hyperlipidemia  Type 2 diabetes mellitus     Plan  Admit to inpatient, telemetry  Continuous pulse ox  IV Lasix 80 mg twice daily  Cardiac echo  Continue heparin infusion  Plan for cardiac cath on 6/5  Pulmonology consult, appreciate recommendation  Aspirin 81 mg daily  Continue rosuvastatin  [place] Pulmicort  Bronchodilator protocol  DuoNebs every 6 hours as needed  Prednisone 40 mg p.o. daily for 4 days  Hypoglycemia protocol  Low-dose sliding scale insulin  POC glucose every 6 hours         Discussed with RN.    VTE Prophylaxis:  Pharmacologic & mechanical VTE prophylaxis orders are present.        CODE STATUS:   Code Status (Patient has no pulse and is not breathing): CPR (Attempt to Resuscitate)  Medical Interventions (Patient has pulse or is breathing): Full Support  Level Of Support Discussed With: Patient      Electronically signed by Leonidas Ashley MD, 6/4/2025, 15:53 EDT.

## 2025-06-04 NOTE — PROGRESS NOTES
Pulmonary / Critical Care Progress Note      Patient Name: Kash Huff  : 1970  MRN: [id number]  Attending:  Leonidas Ashley MD  Date of admission: 6/3/2025    Subjective   Subjective   Follow-up for bilateral pleural effusions    Over past 24 hours: Remains on heparin drip    This morning,  Currently on room air  Resting in bed  Denies any chest pain or chest tightness  Reports feeling better  No fever or chills  Denies cough  Denies chest pain chest tightness    Objective   Objective     Vitals:   Temp:  [97.4 °F (36.3 °C)-98.3 °F (36.8 °C)] 98.3 °F (36.8 °C)  Heart Rate:  [71-94] 71  Resp:  [18-20] 18  BP: (106-141)/(60-76) 110/60    Physical Exam   Vital Signs Reviewed   General:  WDWN, Alert, NAD.  Male, lying in bed  HEENT:  PERRL, EOMI.  OP, nares clear  Chest: Good aeration with diminished breath sounds bilaterally with Velcro-like crackles, currently on room air  CV: RRR, no MGR, pulses 2+, equal.  Abd:  Soft, NT, ND, + BS  EXT:  no clubbing, no cyanosis, no edema  Neuro:  A&Ox3, CN grossly intact, no focal deficits.  Skin: No rashes or lesions noted      Result Review    Result Review:  I have personally reviewed the results from the time of this admission to 2025 11:09 EDT and agree with these findings:  []  Laboratory  []  Microbiology  []  Radiology  []  EKG/Telemetry   []  Cardiology/Vascular   []  Pathology  []  Old records  []  Other:  Most notable findings include:   -     Assessment & Plan   Assessment / Plan     Active Hospital Problems:  Active Hospital Problems    Diagnosis     **Acute hypoxic respiratory failure     Acute systolic congestive heart failure     NSTEMI (non-ST elevated myocardial infarction)          Impression:  Bilateral pleural effusions  Pulmonary edema  Concern for congestive heart failure  Elevated proBNP  NSTEMI, likely type II secondary to demand ischemia  Lactic acidosis, resolved  Leukocytosis  Ongoing tobacco abuse of cigarettes  Heart failure with  reduced EF, EF 25%     Plan:     Continue supplemental oxygen to keep SpO2 greater than 90%  Chest CT showed moderate to large bilateral effusions  Agree with Lasix 80 mg IV twice daily.  Heparin drip on per ACS protocol.  Cardiology on board.  Plans for left and right heart cath tomorrow  Continue prednisone 40 mg daily  Can continue Brovana, Pulmicort, DuoNeb as needed  2D echo show EF 25%.  Continue pulmonary hygiene  Encourage I-S and flutter valve use   Will likely proceed with thoracentesis once heparin is able to be held.  Encourage mobilization out of bed chair as tolerated.    VTE Prophylaxis:  Pharmacologic & mechanical VTE prophylaxis orders are present.    CODE STATUS:   Code Status (Patient has no pulse and is not breathing): CPR (Attempt to Resuscitate)  Medical Interventions (Patient has pulse or is breathing): Full Support  Level Of Support Discussed With: Patient    Labs, images, and medications personally reviewed.  Discussed with patient  Electronically signed by GEETHA Mg, 06/04/25, 12:00 PM EDT.  This patient was seen by both a physician and a NP. ICori MD, spent >50% of time in accordance with split shared billing. This included personally reviewing all pertinent labs, imaging, microbiology and documentation. Also discussing the case with the patient and any available family, the admitting physician and any available ancillary staff.   Electronically signed by Cori Menendez MD, 06/04/25, 1:42 PM EDT.

## 2025-06-04 NOTE — PROGRESS NOTES
[facility]     Cardiology Progress Note    Patient Name: Kash Huff  : 1970  MRN: [id number]  Primary Care Physician:  Brock Davison PA  Date of admission: 6/3/2025    Subjective   Subjective     Chief Complaint: The patient feels better today with less shortness of breath.  He is diuresing well    Interval HPI:    Patient remains in normal sinus rhythm    Review of Systems   All systems were reviewed and negative except for: Dyspnea    Objective   Objective     Vitals:   Temp:  [97.4 °F (36.3 °C)-98.3 °F (36.8 °C)] 98.3 °F (36.8 °C)  Heart Rate:  [71-94] 71  Resp:  [18-36] 18  BP: (106-141)/(60-76) 110/60  Physical Exam      Alert  Heart. Regular, no gallops, no rubs  Lungs: No rales, no wheezing  Abdomen: bs +  LE: no edema  Neurologic. No motor deficits.     Scheduled Meds:arformoterol, 15 mcg, Nebulization, BID - RT  aspirin, 81 mg, Oral, Daily  budesonide, 0.5 mg, Nebulization, BID - RT  empagliflozin, 10 mg, Oral, Daily  furosemide, 80 mg, Intravenous, BID Diuretics  insulin regular, 2-7 Units, Subcutaneous, 4x Daily PC & at Bedtime  predniSONE, 40 mg, Oral, Daily  rosuvastatin, 5 mg, Oral, Daily  sodium chloride, 10 mL, Intravenous, Q12H  spironolactone, 25 mg, Oral, Daily      Continuous Infusions:heparin, 12 Units/kg/hr, Last Rate: 18 Units/kg/hr (25 06)           Result Review    Result Review:  I have personally reviewed the results from the time of this admission to 2025 09:28 EDT and agree with these findings:  [x]  Laboratory  []  Microbiology  [x]  Radiology  [x]  EKG/Telemetry   [x]  Cardiology/Vascular   []  Pathology  []  Old records  []  Other:  Most notable findings include:     CBC          6/3/2025    01:30 2025    05:42   CBC   WBC 15.91  21.19    RBC 5.10  4.90    Hemoglobin 10.2  9.9    Hematocrit 35.6  33.1    MCV 69.8  67.6    MCH 20.0  20.2    MCHC 28.7  29.9    RDW 20.1  19.9    Platelets 224  224      CMP          6/3/2025    01:30 2025     05:42   CMP   Glucose 171  117    BUN 14.5  21.8    Creatinine 0.98  0.79    EGFR 92.2  106.2    Sodium 138  142    Potassium 3.8  3.6    Chloride 103  106    Calcium 9.3  8.9    Total Protein 7.5     Albumin 4.5     Globulin 3.0     Total Bilirubin 0.7     Alkaline Phosphatase 109     AST (SGOT) 22     ALT (SGPT) 14     Albumin/Globulin Ratio 1.5     BUN/Creatinine Ratio 14.8  27.6    Anion Gap 17.6  15.4       CARDIAC LABS:      Lab 06/03/25  0229 06/03/25  0130   PROBNP  --  3,045.0*   HSTROP T 264* 287*   PROTIME  --  16.6*   INR  --  1.29*        Assessment & Plan   Assessment / Plan     Brief Patient Summary:  Kash Huff is a 55 y.o. male with heart failure with reduced ejection fraction about 25% by 2D echo.  He had wall motion abnormalities.    Active Hospital Problems:  Active Hospital Problems    Diagnosis     **Acute hypoxic respiratory failure     Acute systolic congestive heart failure     NSTEMI (non-ST elevated myocardial infarction)            Plan:     Continue with optimal guideline directed medical therapy with diuresis, Aldactone and Jardiance as tolerated.  I would add Entresto at low-dose as tolerated.  Follow renal function and potassium levels.  Continue with statin therapy and low-dose aspirin .  Will proceed with a right and left heart catheterization tomorrow.  Recent benefits of the procedure were discussed in detail with the patient including possibility of myocardial infarction, vascular complications, contrast-induced nephropathy, bleeding, and others.  The patient agreed.  N.p.o. postmidnight.       CODE STATUS:   Code Status (Patient has no pulse and is not breathing): CPR (Attempt to Resuscitate)  Medical Interventions (Patient has pulse or is breathing): Full Support  Level Of Support Discussed With: Patient      Electronically signed by Daniel Ybarra MD, 06/04/25, 9:24 AM EDT.

## 2025-06-04 NOTE — THERAPY EVALUATION
Acute Care - Physical Therapy Initial Evaluation   Juan     Patient Name: Kash Huff  : 1970  MRN: [id number]  Today's Date: 2025      Visit Dx:     ICD-10-CM ICD-9-CM   1. Acute hypoxic respiratory failure  J96.01 518.81   2. NSTEMI (non-ST elevated myocardial infarction)  I21.4 410.70   3. Acute on chronic congestive heart failure, unspecified heart failure type  I50.9 428.0   4. Difficulty walking  R26.2 719.7     Patient Active Problem List   Diagnosis    BRBPR (bright red blood per rectum)    Change in bowel habits    Acute hypoxic respiratory failure    Acute systolic congestive heart failure    NSTEMI (non-ST elevated myocardial infarction)     Past Medical History:   Diagnosis Date    Heart burn      Past Surgical History:   Procedure Laterality Date    COLONOSCOPY N/A 2022    Procedure: COLONOSCOPY with biopsy;  Surgeon: Dheeraj Camacho MD;  Location: [facility] ENDOSCOPY;  Service: General;  Laterality: N/A;  colon polyp    FINGER SURGERY       PT Assessment (Last 12 Hours)       PT Evaluation and Treatment       Row Name 25 1600          Physical Therapy Time and Intention    Subjective Information no complaints  -CS     Document Type evaluation  -CS     Mode of Treatment individual therapy;physical therapy  -CS     Patient Effort good  -CS     Symptoms Noted During/After Treatment none  -CS       Row Name 25 1600          General Information    Patient Profile Reviewed yes  -CS     Patient Observations alert;cooperative;agree to therapy  -CS     Prior Level of Function independent:;all household mobility;gait;transfer;bed mobility;ADL's  -CS     Equipment Currently Used at Home none  Pt reports he uses no AD for ambulation, stands to shower, and no home O2 used.  -CS     Existing Precautions/Restrictions no known precautions/restrictions  -CS     Barriers to Rehab none identified  -CS       Row Name 25 1600          Living Environment    Current Living  Arrangements home  -CS     Home Accessibility stairs to enter home;stairs within home  -CS     People in Home spouse  -CS     Primary Care Provided by self  -CS       Row Name 06/04/25 1600          Home Main Entrance    Number of Stairs, Main Entrance three  -CS     Stair Railings, Main Entrance railings safe and in good condition  -CS       Row Name 06/04/25 1600          Pain    Pretreatment Pain Rating 0/10 - no pain  -CS     Posttreatment Pain Rating 0/10 - no pain  -CS       Row Name 06/04/25 1600          Cognition    Orientation Status (Cognition) oriented x 3  -CS       Row Name 06/04/25 1600          Range of Motion Comprehensive    General Range of Motion no range of motion deficits identified  -CS       Row Name 06/04/25 1600          Strength Comprehensive (MMT)    General Manual Muscle Testing (MMT) Assessment no strength deficits identified  -       Row Name 06/04/25 1600          Bed Mobility    Bed Mobility bed mobility (all) activities  -     All Activities, [place] (Bed Mobility) independent  -       Row Name 06/04/25 1600          Transfers    Transfers sit-stand transfer;stand-sit transfer  -       Row Name 06/04/25 1600          Sit-Stand Transfer    Sit-Stand [place] (Transfers) independent  -       Row Name 06/04/25 1600          Stand-Sit Transfer    Stand-Sit [place] (Transfers) independent  -CS       Row Name 06/04/25 1600          Gait/Stairs (Locomotion)    Gait/Stairs Locomotion gait/ambulation independence  -     [place] Level (Gait) independent  -CS     Patient was able to Ambulate yes  -CS     Distance in Feet (Gait) 40  -CS     Pattern (Gait) step-through  -       Row Name 06/04/25 1600          Safety Issues/Impairments Affecting Functional Mobility    Impairments Affecting Function (Mobility) endurance/activity tolerance;shortness of breath  -       Row Name 06/04/25 1600          Balance    Balance Assessment standing dynamic balance  -      Dynamic Standing Balance independent  -CS     Position/Device Used, Standing Balance unsupported  -CS       Row Name 06/04/25 1600          Plan of Care Review    Plan of Care Reviewed With patient  -CS     Progress no change  -CS     Outcome Evaluation Pt presents with no physical limitations that impede his ability to return home independently or with assist from family as needed. Pt was encouraged to continue ambulating as tolerated while here at the hospital. He will be discharged from PT caseload.  -CS       Row Name 06/04/25 1600          Positioning and Restraints    Pre-Treatment Position in bed  -CS     Post Treatment Position bed  -CS     In Bed sitting EOB;call light within reach;encouraged to call for assist  -CS       Row Name 06/04/25 1600          Therapy Assessment/Plan (PT)    Criteria for Skilled Interventions Met (PT) no problems identified which require skilled intervention  -CS     Therapy Frequency (PT) evaluation only  -CS       Row Name 06/04/25 1600          PT Evaluation Complexity    History, PT Evaluation Complexity no personal factors and/or comorbidities  -CS     Examination of Body Systems (PT Eval Complexity) total of 4 or more elements  -CS     Clinical Presentation (PT Evaluation Complexity) stable  -CS     Clinical Decision Making (PT Evaluation Complexity) low complexity  -CS     Overall Complexity (PT Evaluation Complexity) low complexity  -CS       Row Name 06/04/25 1600          Therapy Plan Review/Discharge Plan (PT)    Therapy Plan Review (PT) evaluation/treatment results reviewed;patient  -CS       [facility] Name 06/04/25 1600          Physical Therapy Goals    Problem Specific Goal Selection (PT) problem specific goal 1, PT  -CS       [facility] 06/04/25 1600          Problem Specific Goal 1 (PT)    Problem Specific Goal 1 (PT) Complete PT evaluation  -CS     Time Frame (Problem Specific Goal 1, PT) by discharge  -CS     Progress/Outcome (Problem Specific Goal 1, PT) goal met  -CS                User Key  (r) = Recorded By, (t) = Taken By, (c) = Cosigned By      Initials Name Provider Type    Clau Teague PT Physical Therapist                      PT Recommendation and Plan  Anticipated Discharge Disposition (PT): home  Therapy Frequency (PT): evaluation only  Plan of Care Reviewed With: patient  Progress: no change  Outcome Evaluation: Pt presents with no physical limitations that impede his ability to return home independently or with assist from family as needed. Pt was encouraged to continue ambulating as tolerated while here at the hospital. He will be discharged from PT caseload.   Outcome Measures       Row Name 06/04/25 1600             How much help from another person do you currently need...    Turning from your back to your side while in flat bed without using bedrails? 4  -CS      Moving from lying on back to sitting on the side of a flat bed without bedrails? 4  -CS      Moving to and from a bed to a chair (including a wheelchair)? 4  -CS      Standing up from a chair using your arms (e.g., wheelchair, bedside chair)? 4  -CS      Climbing 3-5 steps with a railing? 4  -CS      To walk in hospital room? 4  -CS      AM-PAC 6 Clicks Score (PT) 24  -CS         Functional Assessment    Outcome Measure Options AM-PAC 6 Clicks Basic Mobility (PT)  -CS                User Key  (r) = Recorded By, (t) = Taken By, (c) = Cosigned By      Initials Name Provider Type    Clau Teague PT Physical Therapist                     Time Calculation:    PT Charges       Row Name 06/04/25 1611             Time Calculation    PT Received On 06/04/25  -CS         Untimed Charges    PT Eval/Re-eval Minutes 32  -CS         Total Minutes    Untimed Charges Total Minutes 32  -CS       Total Minutes 32  -CS                User Key  (r) = Recorded By, (t) = Taken By, (c) = Cosigned By      Initials Name Provider Type    Clau Teague PT Physical Therapist                  Therapy Charges for  Today       Code Description Service Date Service Provider Modifiers Qty    [id number] HC PT EVAL LOW COMPLEXITY 3 6/4/2025 Clau Covington, PT GP 1            PT G-Codes  Outcome Measure Options: AM-PAC 6 Clicks Basic Mobility (PT)  AM-PAC 6 Clicks Score (PT): 24    Clau Covington, PT  6/4/2025

## 2025-06-05 PROBLEM — I50.23 ACUTE ON CHRONIC HFREF (HEART FAILURE WITH REDUCED EJECTION FRACTION): Status: ACTIVE | Noted: 2025-06-05

## 2025-06-05 PROBLEM — I21.9 TYPE 1 MYOCARDIAL INFARCTION: Status: ACTIVE | Noted: 2025-06-05

## 2025-06-05 LAB
ANION GAP SERPL CALCULATED.3IONS-SCNC: 15.6 MMOL/L (ref 5–15)
APTT PPP: 34.6 SECONDS (ref 78–95.9)
APTT PPP: 65 SECONDS (ref 78–95.9)
APTT PPP: 81.3 SECONDS (ref 78–95.9)
ARTERIAL PATENCY WRIST A: ABNORMAL
ATMOSPHERIC PRESS: 742.8 MMHG
BASE DEFICIT: ABNORMAL
BASE EXCESS BLDA CALC-SCNC: 0.2 MMOL/L (ref -2–2)
BASE EXCESS BLDV CALC-SCNC: -2 MMOL/L (ref -2–2)
BDY SITE: ABNORMAL
BUN SERPL-MCNC: 28.5 MG/DL (ref 6–20)
BUN/CREAT SERPL: 29.7 (ref 7–25)
CA-I BLDA-SCNC: 1.18 MMOL/L (ref 4.5–5.3)
CALCIUM SPEC-SCNC: 9.3 MG/DL (ref 8.6–10.5)
CHLORIDE SERPL-SCNC: 102 MMOL/L (ref 98–107)
CO2 CONTENT VENOUS: 24 MMOL/L
CO2 SERPL-SCNC: 20.4 MMOL/L (ref 22–29)
CREAT SERPL-MCNC: 0.96 MG/DL (ref 0.76–1.27)
DEPRECATED RDW RBC AUTO: 46.3 FL (ref 37–54)
EGFRCR SERPLBLD CKD-EPI 2021: 94.5 ML/MIN/1.73
ERYTHROCYTE [DISTWIDTH] IN BLOOD BY AUTOMATED COUNT: 20.1 % (ref 12.3–15.4)
GLUCOSE BLDC GLUCOMTR-MCNC: 101 MG/DL (ref 70–130)
GLUCOSE BLDC GLUCOMTR-MCNC: 113 MG/DL (ref 70–99)
GLUCOSE BLDC GLUCOMTR-MCNC: 119 MG/DL (ref 70–99)
GLUCOSE BLDC GLUCOMTR-MCNC: 151 MG/DL (ref 70–99)
GLUCOSE BLDC GLUCOMTR-MCNC: 179 MG/DL (ref 70–99)
GLUCOSE BLDC GLUCOMTR-MCNC: 272 MG/DL (ref 70–99)
GLUCOSE SERPL-MCNC: 109 MG/DL (ref 65–99)
HCO3 BLDA-SCNC: 22.8 MMOL/L (ref 22–26)
HCO3 BLDV-SCNC: 22.5 MMOL/L (ref 22–26)
HCT VFR BLD AUTO: 33.9 % (ref 37.5–51)
HCT VFR BLD CALC: 33 % (ref 38–51)
HCT VFR BLDA CALC: 36 % (ref 38–51)
HEMODILUTION: NO
HGB BLD-MCNC: 10.1 G/DL (ref 13–17.7)
HGB BLDA-MCNC: 11.3 G/DL (ref 12–18)
HGB BLDA-MCNC: 12.2 G/DL (ref 12–17)
MAGNESIUM SERPL-MCNC: 1.9 MG/DL (ref 1.6–2.6)
MCH RBC QN AUTO: 20 PG (ref 26.6–33)
MCHC RBC AUTO-ENTMCNC: 29.8 G/DL (ref 31.5–35.7)
MCV RBC AUTO: 67 FL (ref 79–97)
MODALITY: ABNORMAL
PCO2 BLDA: 29.6 MM HG (ref 35–45)
PCO2 BLDV: 33.6 MM HG (ref 41–51)
PH BLDA: 7.49 PH UNITS (ref 7.35–7.45)
PH BLDV: 7.44 PH UNITS (ref 7.31–7.41)
PLATELET # BLD AUTO: 212 10*3/MM3 (ref 140–450)
PMV BLD AUTO: ABNORMAL FL
PO2 BLDA: 61.2 MM HG (ref 80–100)
PO2 BLDV: 32 MM HG (ref 35–42)
POTASSIUM BLDA-SCNC: 3.1 MMOL/L (ref 3.5–4.9)
POTASSIUM SERPL-SCNC: 3.3 MMOL/L (ref 3.5–5.2)
QT INTERVAL: 444 MS
QTC INTERVAL: 488 MS
RBC # BLD AUTO: 5.06 10*6/MM3 (ref 4.14–5.8)
SAO2 % BLDCOA: 93.4 % (ref 95–99)
SAO2 % BLDCOV: 64 % (ref 45–75)
SODIUM BLD-SCNC: 140 MMOL/L (ref 138–146)
SODIUM SERPL-SCNC: 138 MMOL/L (ref 136–145)
WBC NRBC COR # BLD AUTO: 19.32 10*3/MM3 (ref 3.4–10.8)

## 2025-06-05 PROCEDURE — 82330 ASSAY OF CALCIUM: CPT

## 2025-06-05 PROCEDURE — C1894 INTRO/SHEATH, NON-LASER: HCPCS

## 2025-06-05 PROCEDURE — 82948 REAGENT STRIP/BLOOD GLUCOSE: CPT

## 2025-06-05 PROCEDURE — C1894 INTRO/SHEATH, NON-LASER: HCPCS | Performed by: INTERNAL MEDICINE

## 2025-06-05 PROCEDURE — 25010000002 HEPARIN (PORCINE) PER 1000 UNITS: Performed by: INTERNAL MEDICINE

## 2025-06-05 PROCEDURE — 25010000002 MIDAZOLAM PER 1MG: Performed by: INTERNAL MEDICINE

## 2025-06-05 PROCEDURE — 94760 N-INVAS EAR/PLS OXIMETRY 1: CPT

## 2025-06-05 PROCEDURE — 63710000001 INSULIN REGULAR HUMAN PER 5 UNITS: Performed by: INTERNAL MEDICINE

## 2025-06-05 PROCEDURE — 5A2204Z RESTORATION OF CARDIAC RHYTHM, SINGLE: ICD-10-PCS | Performed by: INTERNAL MEDICINE

## 2025-06-05 PROCEDURE — 85730 THROMBOPLASTIN TIME PARTIAL: CPT | Performed by: INTERNAL MEDICINE

## 2025-06-05 PROCEDURE — 80048 BASIC METABOLIC PNL TOTAL CA: CPT | Performed by: STUDENT IN AN ORGANIZED HEALTH CARE EDUCATION/TRAINING PROGRAM

## 2025-06-05 PROCEDURE — 25010000002 HEPARIN (PORCINE) 25000-0.45 UT/250ML-% SOLUTION: Performed by: INTERNAL MEDICINE

## 2025-06-05 PROCEDURE — 84132 ASSAY OF SERUM POTASSIUM: CPT

## 2025-06-05 PROCEDURE — 82803 BLOOD GASES ANY COMBINATION: CPT

## 2025-06-05 PROCEDURE — 82947 ASSAY GLUCOSE BLOOD QUANT: CPT

## 2025-06-05 PROCEDURE — 94799 UNLISTED PULMONARY SVC/PX: CPT

## 2025-06-05 PROCEDURE — 63710000001 PREDNISONE PER 1 MG: Performed by: INTERNAL MEDICINE

## 2025-06-05 PROCEDURE — 4A023N8 MEASUREMENT OF CARDIAC SAMPLING AND PRESSURE, BILATERAL, PERCUTANEOUS APPROACH: ICD-10-PCS | Performed by: INTERNAL MEDICINE

## 2025-06-05 PROCEDURE — 93005 ELECTROCARDIOGRAM TRACING: CPT | Performed by: INTERNAL MEDICINE

## 2025-06-05 PROCEDURE — C1769 GUIDE WIRE: HCPCS | Performed by: INTERNAL MEDICINE

## 2025-06-05 PROCEDURE — 99232 SBSQ HOSP IP/OBS MODERATE 35: CPT | Performed by: STUDENT IN AN ORGANIZED HEALTH CARE EDUCATION/TRAINING PROGRAM

## 2025-06-05 PROCEDURE — 85014 HEMATOCRIT: CPT

## 2025-06-05 PROCEDURE — 25510000001 IOPAMIDOL PER 1 ML: Performed by: INTERNAL MEDICINE

## 2025-06-05 PROCEDURE — 85730 THROMBOPLASTIN TIME PARTIAL: CPT | Performed by: STUDENT IN AN ORGANIZED HEALTH CARE EDUCATION/TRAINING PROGRAM

## 2025-06-05 PROCEDURE — 99232 SBSQ HOSP IP/OBS MODERATE 35: CPT | Performed by: INTERNAL MEDICINE

## 2025-06-05 PROCEDURE — 85027 COMPLETE CBC AUTOMATED: CPT | Performed by: STUDENT IN AN ORGANIZED HEALTH CARE EDUCATION/TRAINING PROGRAM

## 2025-06-05 PROCEDURE — 84295 ASSAY OF SERUM SODIUM: CPT

## 2025-06-05 PROCEDURE — 25010000002 FUROSEMIDE PER 20 MG: Performed by: INTERNAL MEDICINE

## 2025-06-05 PROCEDURE — 93456 R HRT CORONARY ARTERY ANGIO: CPT | Performed by: INTERNAL MEDICINE

## 2025-06-05 PROCEDURE — 25010000002 MAGNESIUM SULFATE IN D5W 1G/100ML (PREMIX) 1-5 GM/100ML-% SOLUTION: Performed by: INTERNAL MEDICINE

## 2025-06-05 PROCEDURE — 25010000002 FENTANYL CITRATE (PF) 50 MCG/ML SOLUTION: Performed by: INTERNAL MEDICINE

## 2025-06-05 PROCEDURE — 25010000002 HEPARIN (PORCINE) 25000-0.45 UT/250ML-% SOLUTION: Performed by: STUDENT IN AN ORGANIZED HEALTH CARE EDUCATION/TRAINING PROGRAM

## 2025-06-05 PROCEDURE — 25010000002 LIDOCAINE 2% SOLUTION: Performed by: INTERNAL MEDICINE

## 2025-06-05 PROCEDURE — 25810000003 SODIUM CHLORIDE 0.9 % SOLUTION: Performed by: INTERNAL MEDICINE

## 2025-06-05 PROCEDURE — 25010000002 POTASSIUM CHLORIDE 10 MEQ/100ML SOLUTION: Performed by: INTERNAL MEDICINE

## 2025-06-05 PROCEDURE — B2111ZZ FLUOROSCOPY OF MULTIPLE CORONARY ARTERIES USING LOW OSMOLAR CONTRAST: ICD-10-PCS | Performed by: INTERNAL MEDICINE

## 2025-06-05 PROCEDURE — 25010000002 AMIODARONE PER 30 MG: Performed by: INTERNAL MEDICINE

## 2025-06-05 PROCEDURE — 82948 REAGENT STRIP/BLOOD GLUCOSE: CPT | Performed by: INTERNAL MEDICINE

## 2025-06-05 PROCEDURE — 93010 ELECTROCARDIOGRAM REPORT: CPT | Performed by: INTERNAL MEDICINE

## 2025-06-05 PROCEDURE — 83735 ASSAY OF MAGNESIUM: CPT | Performed by: STUDENT IN AN ORGANIZED HEALTH CARE EDUCATION/TRAINING PROGRAM

## 2025-06-05 RX ORDER — POTASSIUM CHLORIDE 750 MG/1
40 CAPSULE, EXTENDED RELEASE ORAL ONCE
Status: COMPLETED | OUTPATIENT
Start: 2025-06-05 | End: 2025-06-05

## 2025-06-05 RX ORDER — FENTANYL CITRATE 50 UG/ML
INJECTION, SOLUTION INTRAMUSCULAR; INTRAVENOUS
Status: DISCONTINUED | OUTPATIENT
Start: 2025-06-05 | End: 2025-06-05 | Stop reason: HOSPADM

## 2025-06-05 RX ORDER — MAGNESIUM SULFATE 1 G/100ML
1 INJECTION INTRAVENOUS ONCE
Status: COMPLETED | OUTPATIENT
Start: 2025-06-05 | End: 2025-06-05

## 2025-06-05 RX ORDER — TICAGRELOR 90 MG/1
90 TABLET, FILM COATED ORAL 2 TIMES DAILY
Status: DISCONTINUED | OUTPATIENT
Start: 2025-06-05 | End: 2025-06-05

## 2025-06-05 RX ORDER — AMIODARONE HYDROCHLORIDE 150 MG/3ML
INJECTION, SOLUTION INTRAVENOUS
Status: DISCONTINUED | OUTPATIENT
Start: 2025-06-05 | End: 2025-06-05 | Stop reason: HOSPADM

## 2025-06-05 RX ORDER — SODIUM CHLORIDE 9 MG/ML
100 INJECTION, SOLUTION INTRAVENOUS CONTINUOUS
Status: ACTIVE | OUTPATIENT
Start: 2025-06-05 | End: 2025-06-05

## 2025-06-05 RX ORDER — POTASSIUM CHLORIDE 7.45 MG/ML
INJECTION INTRAVENOUS
Status: COMPLETED | OUTPATIENT
Start: 2025-06-05 | End: 2025-06-05

## 2025-06-05 RX ORDER — METOPROLOL SUCCINATE 25 MG/1
25 TABLET, EXTENDED RELEASE ORAL
Status: DISCONTINUED | OUTPATIENT
Start: 2025-06-05 | End: 2025-06-10 | Stop reason: HOSPADM

## 2025-06-05 RX ORDER — ACETAMINOPHEN 325 MG/1
650 TABLET ORAL EVERY 4 HOURS PRN
Status: DISCONTINUED | OUTPATIENT
Start: 2025-06-05 | End: 2025-06-09 | Stop reason: SDUPTHER

## 2025-06-05 RX ORDER — LIDOCAINE HYDROCHLORIDE 20 MG/ML
INJECTION, SOLUTION INFILTRATION; PERINEURAL
Status: DISCONTINUED | OUTPATIENT
Start: 2025-06-05 | End: 2025-06-05 | Stop reason: HOSPADM

## 2025-06-05 RX ORDER — TICAGRELOR 90 MG/1
90 TABLET, FILM COATED ORAL 2 TIMES DAILY
Status: DISCONTINUED | OUTPATIENT
Start: 2025-06-05 | End: 2025-06-09 | Stop reason: SDUPTHER

## 2025-06-05 RX ORDER — MIDAZOLAM HYDROCHLORIDE 2 MG/2ML
INJECTION, SOLUTION INTRAMUSCULAR; INTRAVENOUS
Status: DISCONTINUED | OUTPATIENT
Start: 2025-06-05 | End: 2025-06-05 | Stop reason: HOSPADM

## 2025-06-05 RX ORDER — NITROGLYCERIN 0.4 MG/1
0.4 TABLET SUBLINGUAL
Status: DISCONTINUED | OUTPATIENT
Start: 2025-06-05 | End: 2025-06-09 | Stop reason: SDUPTHER

## 2025-06-05 RX ORDER — ASPIRIN 81 MG/1
TABLET, CHEWABLE ORAL
Status: DISCONTINUED | OUTPATIENT
Start: 2025-06-05 | End: 2025-06-05 | Stop reason: HOSPADM

## 2025-06-05 RX ORDER — VERAPAMIL HYDROCHLORIDE 2.5 MG/ML
INJECTION INTRAVENOUS
Status: DISCONTINUED | OUTPATIENT
Start: 2025-06-05 | End: 2025-06-05 | Stop reason: HOSPADM

## 2025-06-05 RX ORDER — ONDANSETRON 4 MG/1
4 TABLET, ORALLY DISINTEGRATING ORAL EVERY 6 HOURS PRN
Status: DISCONTINUED | OUTPATIENT
Start: 2025-06-05 | End: 2025-06-09 | Stop reason: SDUPTHER

## 2025-06-05 RX ORDER — ONDANSETRON 2 MG/ML
4 INJECTION INTRAMUSCULAR; INTRAVENOUS EVERY 6 HOURS PRN
Status: DISCONTINUED | OUTPATIENT
Start: 2025-06-05 | End: 2025-06-09 | Stop reason: SDUPTHER

## 2025-06-05 RX ORDER — HEPARIN SODIUM 1000 [USP'U]/ML
INJECTION, SOLUTION INTRAVENOUS; SUBCUTANEOUS
Status: DISCONTINUED | OUTPATIENT
Start: 2025-06-05 | End: 2025-06-05 | Stop reason: HOSPADM

## 2025-06-05 RX ORDER — TICAGRELOR 90 MG/1
TABLET, FILM COATED ORAL
Status: DISCONTINUED | OUTPATIENT
Start: 2025-06-05 | End: 2025-06-05 | Stop reason: HOSPADM

## 2025-06-05 RX ADMIN — SACUBITRIL AND VALSARTAN 0.5 TABLET: 24; 26 TABLET, FILM COATED ORAL at 20:38

## 2025-06-05 RX ADMIN — POTASSIUM CHLORIDE 40 MEQ: 750 CAPSULE, EXTENDED RELEASE ORAL at 10:30

## 2025-06-05 RX ADMIN — TICAGRELOR 90 MG: 90 TABLET ORAL at 20:39

## 2025-06-05 RX ADMIN — POTASSIUM CHLORIDE 40 MEQ: 750 CAPSULE, EXTENDED RELEASE ORAL at 14:17

## 2025-06-05 RX ADMIN — SPIRONOLACTONE 25 MG: 25 TABLET ORAL at 12:18

## 2025-06-05 RX ADMIN — Medication 10 ML: at 10:34

## 2025-06-05 RX ADMIN — HEPARIN SODIUM 19 UNITS/KG/HR: 10000 INJECTION, SOLUTION INTRAVENOUS at 00:17

## 2025-06-05 RX ADMIN — PREDNISONE 40 MG: 20 TABLET ORAL at 10:30

## 2025-06-05 RX ADMIN — INSULIN HUMAN 2 UNITS: 100 INJECTION, SOLUTION PARENTERAL at 17:00

## 2025-06-05 RX ADMIN — EMPAGLIFLOZIN 10 MG: 10 TABLET, FILM COATED ORAL at 10:39

## 2025-06-05 RX ADMIN — MAGNESIUM SULFATE 1 G: 1 INJECTION INTRAVENOUS at 09:20

## 2025-06-05 RX ADMIN — Medication 10 ML: at 20:39

## 2025-06-05 RX ADMIN — ROSUVASTATIN CALCIUM 5 MG: 5 TABLET, FILM COATED ORAL at 10:30

## 2025-06-05 RX ADMIN — SODIUM CHLORIDE 100 ML/HR: 9 INJECTION, SOLUTION INTRAVENOUS at 10:38

## 2025-06-05 RX ADMIN — INSULIN HUMAN 4 UNITS: 100 INJECTION, SOLUTION PARENTERAL at 20:38

## 2025-06-05 RX ADMIN — FUROSEMIDE 80 MG: 10 INJECTION, SOLUTION INTRAMUSCULAR; INTRAVENOUS at 17:28

## 2025-06-05 RX ADMIN — INSULIN HUMAN 2 UNITS: 100 INJECTION, SOLUTION PARENTERAL at 12:19

## 2025-06-05 RX ADMIN — HEPARIN SODIUM 19 UNITS/KG/HR: 10000 INJECTION, SOLUTION INTRAVENOUS at 13:32

## 2025-06-05 NOTE — PROGRESS NOTES
[facility]     Cardiology Progress Note    Patient Name: Kash Huff  : 1970  MRN: [id number]  Primary Care Physician:  Brock Davison PA  Date of admission: 6/3/2025    Subjective   Subjective     Chief Complaint: The patient is cardiac wise stable. Feels less short of breath.     Interval HPI:    Patient remains in normal sinus rhythm.     Review of Systems   All systems were reviewed and negative except for: exertional dyspnea.    Objective   Objective     Vitals:   Temp:  [97.7 °F (36.5 °C)-98.1 °F (36.7 °C)] 97.9 °F (36.6 °C)  Heart Rate:  [74-91] 74  Resp:  [16-18] 16  BP: (105-128)/(60-75) 114/75  Physical Exam      Alert  Heart. Regular, no gallops, no rubs  Lungs: no rales, no wheezing  LE: no edema  Neurologic. No motor defictis.     Scheduled Meds:arformoterol, 15 mcg, Nebulization, BID - RT  aspirin, 81 mg, Oral, Daily  budesonide, 0.5 mg, Nebulization, BID - RT  empagliflozin, 10 mg, Oral, Daily  furosemide, 80 mg, Intravenous, BID Diuretics  insulin regular, 2-7 Units, Subcutaneous, 4x Daily PC & at Bedtime  potassium chloride, 40 mEq, Oral, Once  predniSONE, 40 mg, Oral, Daily  rosuvastatin, 5 mg, Oral, Daily  sodium chloride, 10 mL, Intravenous, Q12H  spironolactone, 25 mg, Oral, Daily  ticagrelor, 90 mg, Oral, BID      Continuous Infusions:heparin, 12 Units/kg/hr, Last Rate: 19 Units/kg/hr (25 0017)  sodium chloride, 100 mL/hr           Result Review    Result Review:  I have personally reviewed the results from the time of this admission to 2025 09:20 EDT and agree with these findings:  [x]  Laboratory  []  Microbiology  [x]  Radiology  [x]  EKG/Telemetry   [x]  Cardiology/Vascular   []  Pathology  []  Old records  []  Other:  Most notable findings include:     CBC          6/3/2025    01:30 2025    05:42 2025    03:06   CBC   WBC 15.91  21.19  19.32    RBC 5.10  4.90  5.06    Hemoglobin 10.2  9.9  10.1    Hematocrit 35.6  33.1  33.9    MCV 69.8  67.6   67.0    MCH 20.0  20.2  20.0    MCHC 28.7  29.9  29.8    RDW 20.1  19.9  20.1    Platelets 224  224  212      CMP          6/3/2025    01:30 6/4/2025    05:42 6/5/2025    03:06   CMP   Glucose 171  117  109    BUN 14.5  21.8  28.5    Creatinine 0.98  0.79  0.96    EGFR 92.2  106.2  94.5    Sodium 138  142  138    Potassium 3.8  3.6  3.3    Chloride 103  106  102    Calcium 9.3  8.9  9.3    Total Protein 7.5      Albumin 4.5      Globulin 3.0      Total Bilirubin 0.7      Alkaline Phosphatase 109      AST (SGOT) 22      ALT (SGPT) 14      Albumin/Globulin Ratio 1.5      BUN/Creatinine Ratio 14.8  27.6  29.7    Anion Gap 17.6  15.4  15.6       CARDIAC LABS:      Lab 06/03/25  0229 06/03/25  0130   PROBNP  --  3,045.0*   HSTROP T 264* 287*   PROTIME  --  16.6*   INR  --  1.29*        Assessment & Plan   Assessment / Plan     Brief Patient Summary:  Kash Huff is a 55 y.o. male with with HFrEF, ischemic CMP. He had a cardiac cath today which showed severe mid RCA stenosis, 85-90 %, normal Left main;, subtotal occlusion of the proximal LAD with JON 1-2 flow and mild CX disease. The PCWP is 24 mmHg , the CI is 3.41 with CO of 6.35. He had hypokalemia, been replaced. He developed transient VF in the lab treated with DC cardioversion into sinus rhythm and given Amiodarone 150 mg IVP.     Active Hospital Problems:  Active Hospital Problems    Diagnosis     **Acute hypoxic respiratory failure     Type 1 myocardial infarction     Acute on chronic HFrEF (heart failure with reduced ejection fraction)     Acute systolic congestive heart failure     NSTEMI (non-ST elevated myocardial infarction)            Plan:     I would optimize medical therapy with diuretics leading to euvolemic state. Continue Aldactone and initiate beta-blockers and ARNI as tolerated. Continue with aspirin at low dose and Ticagrelor 90 mg po bid. Will diuresed over the weekend and consider PCI of the RCA +- LAD on Monday Morning. Resume heparin in  2 hours .        CODE STATUS:   Code Status (Patient has no pulse and is not breathing): CPR (Attempt to Resuscitate)  Medical Interventions (Patient has pulse or is breathing): Full Support  Level Of Support Discussed With: Patient      Electronically signed by Daniel Ybarra MD, 06/05/25, 9:20 AM EDT.

## 2025-06-05 NOTE — PLAN OF CARE
Goal Outcome Evaluation:  Plan of Care Reviewed With: patient        Progress: improving  Outcome Evaluation: A&Ox4. Vital signs stable. Heparin drip maintained with Q6 aPTTs. NPO at midnight maintained. 2 units of insulin administered at bedtime. No complaints of pain. No questions or concerns noted at this time. Plan of care ongoing.

## 2025-06-05 NOTE — PROGRESS NOTES
[facility]   Hospitalist Progress Note  Date: 2025  Patient Name: Kash Huff  : 1970  MRN: [id number]  Date of admission: 6/3/2025  Room/Bed: [facility]      Subjective   Subjective     Chief Complaint: Shortness of breath    Summary:Kash Huff is a 55 y.o. male with hypertension, hyperlipidemia, type 2 diabetes who presents to the ED for evaluation of shortness of breath.  X-ray showed bilateral infiltrates concerning for pulmonary edema and suspected small bilateral pleural effusions.  Cardiology consulted.  Pulmonology consulted.  Patient's breathing is improved with diuresis.  Heart catheterization on .  During procedure patient went into V-fib and required cardioversion.  Cardiology planning repeat catheterization on .    Interval Followup:     Patient says that he is feeling okay.  Updated him and his wife at length at bedside.  He does not have any new complaint    All systems reviewed and negative except for what is outlined above.      Objective   Objective     Vitals:   Temp:  [97.7 °F (36.5 °C)-98.2 °F (36.8 °C)] 98.2 °F (36.8 °C)  Heart Rate:  [72-92] 84  Resp:  [16-18] 16  BP: ()/(36-83) 107/61    Physical Exam   General: NAD  Cardiovascular: Normal S1, S2  Pulmonary: Some increased work of breathing  MSK: No lower extremity swelling  Psych: Mood and affect appropriate    Result Review    Result Review:  I have personally reviewed these results:  [x]  Laboratory      Lab 25  1052 25  0811 25  0306 25  1941 25  1214 25  0542 25  1614 25  0855 25  0449 25  0211 25  0130   WBC  --   --  19.32*  --   --  21.19*  --   --   --   --  15.91*   HEMOGLOBIN  --   --  10.1*  --   --  9.9*  --   --   --   --  10.2*   HEMOGLOBIN, POC  --  12.2  --   --   --   --   --   --   --   --   --    HEMATOCRIT  --   --  33.9*  --   --  33.1*  --   --   --   --  35.6*   HEMATOCRIT POC  --  36*  --   --   --   --    --   --   --   --   --    PLATELETS  --   --  212  --   --  224  --   --   --   --  224   NEUTROS ABS  --   --   --   --   --  17.21*  --   --   --   --  11.78*   IMMATURE GRANS (ABS)  --   --   --   --   --  0.10*  --   --   --   --  0.06*   LYMPHS ABS  --   --   --   --   --  2.52  --   --   --   --  2.92   MONOS ABS  --   --   --   --   --  1.33*  --   --   --   --  0.89   EOS ABS  --   --   --   --   --  0.00  --   --   --   --  0.17   MCV  --   --  67.0*  --   --  67.6*  --   --   --   --  69.8*   PROCALCITONIN  --   --   --   --   --   --   --  0.07  --   --   --    LACTATE  --   --   --   --   --   --   --   --  2.0 2.6* 2.5*   PROTIME  --   --   --   --   --   --   --   --   --   --  16.6*   APTT 34.6*  --  81.3 98.7*   < > 75.7*   < > 56.6* 110.9*  --  37.2*    < > = values in this interval not displayed.         Lab 06/05/25  0306 06/04/25  0542 06/03/25  0130   SODIUM 138 142 138   POTASSIUM 3.3* 3.6 3.8   CHLORIDE 102 106 103   CO2 20.4* 20.6* 17.4*   ANION GAP 15.6* 15.4* 17.6*   BUN 28.5* 21.8* 14.5   CREATININE 0.96 0.79 0.98   EGFR 94.5 106.2 92.2   GLUCOSE 109* 117* 171*   CALCIUM 9.3 8.9 9.3   MAGNESIUM 1.9  --   --    HEMOGLOBIN A1C  --   --  5.60         Lab 06/03/25  0130   TOTAL PROTEIN 7.5   ALBUMIN 4.5   GLOBULIN 3.0   ALT (SGPT) 14   AST (SGOT) 22   BILIRUBIN 0.7   ALK PHOS 109         Lab 06/03/25  0229 06/03/25  0130   PROBNP  --  3,045.0*   HSTROP T 264* 287*   PROTIME  --  16.6*   INR  --  1.29*         Lab 06/03/25  0229   CHOLESTEROL 123   LDL CHOL 72   HDL CHOL 29*   TRIGLYCERIDES 117             Lab 06/05/25  0808 06/03/25  0211   PH, ARTERIAL 7.494* 7.357   PCO2, ARTERIAL 29.6* 31.3*   PO2 ART 61.2* 107.0*   O2 SATURATION ART 93.4* 98.0   FIO2  --  35   HCO3 ART 22.8 17.5*   BASE EXCESS ART 0.2 -7.0*     Brief Urine Lab Results       None          [x]  Microbiology   Microbiology Results (last 10 days)       Procedure Component Value - Date/Time    COVID-19, FLU A/B, RSV PCR 1 HR  TAT - Swab, Nasopharynx [[id number]]  (Normal) Collected: 06/03/25 0253    Lab Status: Final result Specimen: Swab from Nasopharynx Updated: 06/03/25 0341     COVID19 Not Detected     Influenza A PCR Not Detected     Influenza B PCR Not Detected     RSV, PCR Not Detected    Narrative:      Fact sheet for providers: https://www.fda.gov/media/249707/download    Fact sheet for patients: https://www.fda.gov/media/008241/download    Test performed by PCR.    Blood Culture - Blood, Arm, Left [[id number]]  (Normal) Collected: 06/03/25 0131    Lab Status: Preliminary result Specimen: Blood from Arm, Left Updated: 06/05/25 0145     Blood Culture No growth at 2 days    Narrative:      Less than seven (7) mL's of blood was collected.  Insufficient quantity may yield false negative results.    Blood Culture - Blood, Arm, Right [[id number]]  (Normal) Collected: 06/03/25 0130    Lab Status: Preliminary result Specimen: Blood from Arm, Right Updated: 06/05/25 0145     Blood Culture No growth at 2 days    Narrative:      Less than seven (7) mL's of blood was collected.  Insufficient quantity may yield false negative results.          [x]  Radiology  CT Angiogram Chest Pulmonary Embolism  Result Date: 6/3/2025  1.  No pulmonary embolism. 2.  No thoracic aortic aneurysm or dissection. 3.  Moderate-to-large bilateral pleural effusions are seen. 4.  There is borderline cardiac enlargement. The left ventricle appears dilated. 5.  There are coronary artery calcifications. 6.  Bilateral nodular groundglass opacities are noted and are nonspecific. Infectious/inflammatory process is possible, as detailed above. 7.  Superimposed mild pulmonary edema is suspected. 8.  Bibasilar pneumonia and/or atelectasis is (are) suggested. 9.  Please see above comments for further detail.   Portions of this note were completed with a voice recognition program.  6/3/2025 4:12 AM by Sudarshan Valderrama MD on Workstation: [email or web address] 1 View  Result Date:  6/3/2025  Bilateral infiltrates are seen. The findings may represent pulmonary edema with vascular congestion. Infectious multifocal pneumonia cannot be excluded. There are suspected small bilateral pleural effusions. Please see above comments for further detail.    Portions of this note were completed with a voice recognition program.  6/3/2025 1:52 AM by Sudarshan Valderrama MD on Workstation: [id number]      []  EKG/Telemetry   []  Cardiology/Vascular   []  Pathology  []  Old records  []  Other:    Assessment & Plan        Assessment and Plan:    Pulmonary edema  bilateral pleural effusions  Heart failure with reduced ejection fraction  NSTEMI   Coronary artery disease  Hypertension  Hyperlipidemia  Type 2 diabetes mellitus     Plan  Admit to inpatient, telemetry  Continuous pulse ox  IV Lasix 80 mg twice daily  Cardiac echo  Continue heparin infusion  Aspirin 81 mg  Rosuvastatin  Metoprolol  Entresto  Brilinta  Aldactone  Jardiance  Brovana, Pulmicort  Bronchodilator protocol  DuoNebs every 6 hours as needed  Prednisone 40 mg p.o. daily for 4 days  Hypoglycemia protocol  Low-dose sliding scale insulin  POC glucose every 6 hours         Discussed with RN.    VTE Prophylaxis:  Pharmacologic & mechanical VTE prophylaxis orders are present.        CODE STATUS:   Code Status (Patient has no pulse and is not breathing): CPR (Attempt to Resuscitate)  Medical Interventions (Patient has pulse or is breathing): Full Support  Level Of Support Discussed With: Patient      Electronically signed by Leonidas Ashley MD, 6/5/2025, 16:22 EDT.

## 2025-06-05 NOTE — PLAN OF CARE
Goal Outcome Evaluation:              Outcome Evaluation: Pt was transfer from [facility] out of cath lab due to episode of vfib. 10cc TR right radial band removed during shift with no complications. Heparin gtt restarted, next PTT ordered for 1930. VSS.

## 2025-06-05 NOTE — PROGRESS NOTES
Enter Query Response Below      Query Response: Respiratory distress only              If applicable, please update the problem list.

## 2025-06-06 ENCOUNTER — APPOINTMENT (OUTPATIENT)
Dept: GENERAL RADIOLOGY | Facility: HOSPITAL | Age: 55
End: 2025-06-06
Payer: OTHER GOVERNMENT

## 2025-06-06 LAB
ANION GAP SERPL CALCULATED.3IONS-SCNC: 15.8 MMOL/L (ref 5–15)
APTT PPP: 101.7 SECONDS (ref 78–95.9)
APTT PPP: 83.1 SECONDS (ref 78–95.9)
APTT PPP: 86 SECONDS (ref 78–95.9)
BUN SERPL-MCNC: 23 MG/DL (ref 6–20)
BUN/CREAT SERPL: 26.7 (ref 7–25)
CALCIUM SPEC-SCNC: 9.6 MG/DL (ref 8.6–10.5)
CHLORIDE SERPL-SCNC: 105 MMOL/L (ref 98–107)
CO2 SERPL-SCNC: 17.2 MMOL/L (ref 22–29)
CREAT SERPL-MCNC: 0.86 MG/DL (ref 0.76–1.27)
DEPRECATED RDW RBC AUTO: 46.7 FL (ref 37–54)
EGFRCR SERPLBLD CKD-EPI 2021: 103.5 ML/MIN/1.73
ERYTHROCYTE [DISTWIDTH] IN BLOOD BY AUTOMATED COUNT: 20.1 % (ref 12.3–15.4)
GLUCOSE BLDC GLUCOMTR-MCNC: 117 MG/DL (ref 70–99)
GLUCOSE BLDC GLUCOMTR-MCNC: 188 MG/DL (ref 70–99)
GLUCOSE BLDC GLUCOMTR-MCNC: 229 MG/DL (ref 70–99)
GLUCOSE BLDC GLUCOMTR-MCNC: 235 MG/DL (ref 70–99)
GLUCOSE SERPL-MCNC: 113 MG/DL (ref 65–99)
HCT VFR BLD AUTO: 37.1 % (ref 37.5–51)
HGB BLD-MCNC: 10.9 G/DL (ref 13–17.7)
MCH RBC QN AUTO: 19.9 PG (ref 26.6–33)
MCHC RBC AUTO-ENTMCNC: 29.4 G/DL (ref 31.5–35.7)
MCV RBC AUTO: 67.6 FL (ref 79–97)
PLATELET # BLD AUTO: 222 10*3/MM3 (ref 140–450)
PMV BLD AUTO: ABNORMAL FL
POTASSIUM SERPL-SCNC: 4 MMOL/L (ref 3.5–5.2)
RBC # BLD AUTO: 5.49 10*6/MM3 (ref 4.14–5.8)
SODIUM SERPL-SCNC: 138 MMOL/L (ref 136–145)
WBC NRBC COR # BLD AUTO: 13.76 10*3/MM3 (ref 3.4–10.8)

## 2025-06-06 PROCEDURE — 80048 BASIC METABOLIC PNL TOTAL CA: CPT | Performed by: INTERNAL MEDICINE

## 2025-06-06 PROCEDURE — 25010000002 HEPARIN (PORCINE) 25000-0.45 UT/250ML-% SOLUTION: Performed by: INTERNAL MEDICINE

## 2025-06-06 PROCEDURE — 99232 SBSQ HOSP IP/OBS MODERATE 35: CPT | Performed by: STUDENT IN AN ORGANIZED HEALTH CARE EDUCATION/TRAINING PROGRAM

## 2025-06-06 PROCEDURE — 63710000001 PREDNISONE PER 1 MG: Performed by: INTERNAL MEDICINE

## 2025-06-06 PROCEDURE — 99232 SBSQ HOSP IP/OBS MODERATE 35: CPT | Performed by: INTERNAL MEDICINE

## 2025-06-06 PROCEDURE — 63710000001 INSULIN REGULAR HUMAN PER 5 UNITS: Performed by: INTERNAL MEDICINE

## 2025-06-06 PROCEDURE — 82948 REAGENT STRIP/BLOOD GLUCOSE: CPT

## 2025-06-06 PROCEDURE — 25010000002 FUROSEMIDE PER 20 MG: Performed by: INTERNAL MEDICINE

## 2025-06-06 PROCEDURE — 85027 COMPLETE CBC AUTOMATED: CPT | Performed by: INTERNAL MEDICINE

## 2025-06-06 PROCEDURE — 85730 THROMBOPLASTIN TIME PARTIAL: CPT | Performed by: STUDENT IN AN ORGANIZED HEALTH CARE EDUCATION/TRAINING PROGRAM

## 2025-06-06 PROCEDURE — 71045 X-RAY EXAM CHEST 1 VIEW: CPT

## 2025-06-06 PROCEDURE — 82948 REAGENT STRIP/BLOOD GLUCOSE: CPT | Performed by: INTERNAL MEDICINE

## 2025-06-06 RX ORDER — SODIUM BICARBONATE 650 MG/1
650 TABLET ORAL 3 TIMES DAILY
Status: DISCONTINUED | OUTPATIENT
Start: 2025-06-06 | End: 2025-06-07

## 2025-06-06 RX ORDER — IOPAMIDOL 755 MG/ML
INJECTION, SOLUTION INTRAVASCULAR
Status: DISCONTINUED | OUTPATIENT
Start: 2025-06-05 | End: 2025-06-06 | Stop reason: HOSPADM

## 2025-06-06 RX ORDER — ROSUVASTATIN CALCIUM 20 MG/1
20 TABLET, COATED ORAL NIGHTLY
Status: DISCONTINUED | OUTPATIENT
Start: 2025-06-06 | End: 2025-06-10 | Stop reason: HOSPADM

## 2025-06-06 RX ADMIN — TICAGRELOR 90 MG: 90 TABLET ORAL at 08:44

## 2025-06-06 RX ADMIN — PREDNISONE 40 MG: 20 TABLET ORAL at 08:44

## 2025-06-06 RX ADMIN — Medication 10 ML: at 20:31

## 2025-06-06 RX ADMIN — METOPROLOL SUCCINATE ER TABLETS 25 MG: 25 TABLET, FILM COATED, EXTENDED RELEASE ORAL at 08:44

## 2025-06-06 RX ADMIN — ROSUVASTATIN CALCIUM 20 MG: 20 TABLET, FILM COATED ORAL at 20:31

## 2025-06-06 RX ADMIN — SODIUM BICARBONATE 650 MG TABLET 650 MG: at 20:31

## 2025-06-06 RX ADMIN — EMPAGLIFLOZIN 10 MG: 10 TABLET, FILM COATED ORAL at 08:44

## 2025-06-06 RX ADMIN — SACUBITRIL AND VALSARTAN 1 TABLET: 24; 26 TABLET, FILM COATED ORAL at 20:31

## 2025-06-06 RX ADMIN — INSULIN HUMAN 3 UNITS: 100 INJECTION, SOLUTION PARENTERAL at 20:30

## 2025-06-06 RX ADMIN — SPIRONOLACTONE 25 MG: 25 TABLET ORAL at 08:44

## 2025-06-06 RX ADMIN — SODIUM BICARBONATE 650 MG TABLET 650 MG: at 15:48

## 2025-06-06 RX ADMIN — SACUBITRIL AND VALSARTAN 1 TABLET: 24; 26 TABLET, FILM COATED ORAL at 08:44

## 2025-06-06 RX ADMIN — TICAGRELOR 90 MG: 90 TABLET ORAL at 20:31

## 2025-06-06 RX ADMIN — ASPIRIN 81 MG: 81 TABLET, COATED ORAL at 08:44

## 2025-06-06 RX ADMIN — FUROSEMIDE 80 MG: 10 INJECTION, SOLUTION INTRAMUSCULAR; INTRAVENOUS at 17:51

## 2025-06-06 RX ADMIN — INSULIN HUMAN 3 UNITS: 100 INJECTION, SOLUTION PARENTERAL at 18:27

## 2025-06-06 RX ADMIN — FUROSEMIDE 80 MG: 10 INJECTION, SOLUTION INTRAMUSCULAR; INTRAVENOUS at 08:43

## 2025-06-06 RX ADMIN — INSULIN HUMAN 2 UNITS: 100 INJECTION, SOLUTION PARENTERAL at 13:29

## 2025-06-06 RX ADMIN — HEPARIN SODIUM 20 UNITS/KG/HR: 10000 INJECTION, SOLUTION INTRAVENOUS at 04:46

## 2025-06-06 RX ADMIN — HEPARIN SODIUM 20 UNITS/KG/HR: 10000 INJECTION, SOLUTION INTRAVENOUS at 20:36

## 2025-06-06 RX ADMIN — Medication 10 ML: at 08:44

## 2025-06-06 RX ADMIN — SODIUM BICARBONATE 650 MG TABLET 650 MG: at 09:02

## 2025-06-06 NOTE — PLAN OF CARE
Goal Outcome Evaluation:  Plan of Care Reviewed With: patient        Progress: improving  Outcome Evaluation: Voiding well. B/P improved. Continues on heparin gtt.

## 2025-06-06 NOTE — PROGRESS NOTES
Pulmonary / Critical Care Progress Note      Patient Name: Kash Huff  : 1970  MRN: [id number]  Attending:  Leonidas Ashley MD  Date of admission: 6/3/2025    Subjective   Subjective   Follow-up for bilateral pleural effusions    Over past 24 hours:   Remains on heparin drip  On room air  Reports feeling better  Making good urine  No fever no chills  Chest x-ray showed improvement of his effusion    Objective   Objective     Vitals:   Temp:  [97.5 °F (36.4 °C)-98.6 °F (37 °C)] 98.6 °F (37 °C)  Heart Rate:  [69-92] 78  Resp:  [16-18] 18  BP: ()/(52-83) 119/78    Physical Exam   Vital Signs Reviewed   General:  WDWN, Alert, NAD.  Male, lying in bed  HEENT:  PERRL, EOMI.  OP, nares clear  Chest: Good aeration; crackles improved; currently on room air  CV: RRR, no MGR, pulses 2+, equal.  Abd:  Soft, NT, ND, + BS  EXT:  no clubbing, no cyanosis, no edema  Neuro:  A&Ox3, CN grossly intact, no focal deficits.  Skin: No rashes or lesions noted      Result Review    Result Review:  I have personally reviewed the results from the time of this admission to 2025 11:57 EDT and agree with these findings:  []  Laboratory  []  Microbiology  []  Radiology  []  EKG/Telemetry   []  Cardiology/Vascular   []  Pathology  []  Old records  []  Other:  Most notable findings include:   -     Assessment & Plan   Assessment / Plan     Active Hospital Problems:  Active Hospital Problems    Diagnosis     **Acute hypoxic respiratory failure     Type 1 myocardial infarction     Acute on chronic HFrEF (heart failure with reduced ejection fraction)     Acute systolic congestive heart failure     NSTEMI (non-ST elevated myocardial infarction)          Impression:  Bilateral pleural effusions  Pulmonary edema  Concern for congestive heart failure  Elevated proBNP  NSTEMI, likely type II secondary to demand ischemia  Lactic acidosis, resolved  Leukocytosis  Ongoing tobacco abuse of cigarettes  Heart failure with reduced EF, EF  25%     Plan:     Continue supplemental oxygen to keep SpO2 greater than 90%  Chest CT showed moderate to large bilateral effusions  2D echo show EF 25%.  Continue with Lasix 80 mg IV twice daily.  Monitor renal function electrolytes.  Status post cath which showed severe mid RCA stenosis, normal left main, subtotal occlusion of proximal LAD. Heparin drip per Cardiology.  Can continue Brovana, Pulmicort, DuoNeb as needed  Continue pulmonary hygiene  Encourage I-S and flutter valve use   Repeat chest x-ray showed resolution of his effusions with diuresis.  No thoracentesis at this time.  Encourage mobilization out of bed chair as tolerated.    VTE Prophylaxis:  Pharmacologic & mechanical VTE prophylaxis orders are present.    CODE STATUS:   Code Status (Patient has no pulse and is not breathing): CPR (Attempt to Resuscitate)  Medical Interventions (Patient has pulse or is breathing): Full Support  Level Of Support Discussed With: Patient    Labs, images, and medications personally reviewed.  Discussed with patient  Electronically signed by Cori Menendez MD, 06/06/25, 12:02 PM EDT.

## 2025-06-06 NOTE — PROGRESS NOTES
[facility]     Cardiology Progress Note    Patient Name: Kash Huff  : 1970  MRN: [id number]  Primary Care Physician:  Brock Davison PA  Date of admission: 6/3/2025    Subjective   Subjective     Chief Complaint: The patient is cardiac wise stable. Denies angina or dyspnea. No events overnight.     Interval HPI:    Patient remains in normal sinus rhythm.     Review of Systems   All systems were reviewed and negative except for: anxiety.     Objective   Objective     Vitals:   Temp:  [97.5 °F (36.4 °C)-98.6 °F (37 °C)] 98.6 °F (37 °C)  Heart Rate:  [69-92] 78  Resp:  [16-18] 18  BP: ()/(36-83) 119/78  Physical Exam      Alert  Heart. Regular, no gallops, no rubs, no murmurs.  Lungs: no rales, no wheezing  LE: No edema  Neurologic. No motor deficits.     Scheduled Meds:arformoterol, 15 mcg, Nebulization, BID - RT  aspirin, 81 mg, Oral, Daily  budesonide, 0.5 mg, Nebulization, BID - RT  empagliflozin, 10 mg, Oral, Daily  furosemide, 80 mg, Intravenous, BID Diuretics  insulin regular, 2-7 Units, Subcutaneous, 4x Daily PC & at Bedtime  metoprolol succinate XL, 25 mg, Oral, Q24H  predniSONE, 40 mg, Oral, Daily  rosuvastatin, 20 mg, Oral, Daily  sacubitril-valsartan, 1 tablet, Oral, Q12H  sodium chloride, 10 mL, Intravenous, Q12H  spironolactone, 25 mg, Oral, Daily  ticagrelor, 90 mg, Oral, BID      Continuous Infusions:heparin, 12 Units/kg/hr, Last Rate: 20 Units/kg/hr (25 0446)           Result Review    Result Review:  I have personally reviewed the results from the time of this admission to 2025 08:29 EDT and agree with these findings:  [x]  Laboratory  []  Microbiology  [x]  Radiology  [x]  EKG/Telemetry   [x]  Cardiology/Vascular   []  Pathology  []  Old records  []  Other:  Most notable findings include:     CBC          2025    05:42 2025    03:06 2025    08:11 2025    03:47   CBC   WBC 21.19  19.32   13.76    RBC 4.90  5.06   5.49    Hemoglobin 9.9   10.1  12.2  10.9    Hematocrit 33.1  33.9  36  37.1    MCV 67.6  67.0   67.6    MCH 20.2  20.0   19.9    MCHC 29.9  29.8   29.4    RDW 19.9  20.1   20.1    Platelets 224  212   222      CMP          6/4/2025    05:42 6/5/2025    03:06 6/6/2025    03:47   CMP   Glucose 117  109  113    BUN 21.8  28.5  23.0    Creatinine 0.79  0.96  0.86    EGFR 106.2  94.5  103.5    Sodium 142  138  138    Potassium 3.6  3.3  4.0    Chloride 106  102  105    Calcium 8.9  9.3  9.6    BUN/Creatinine Ratio 27.6  29.7  26.7    Anion Gap 15.4  15.6  15.8       CARDIAC LABS:      Lab 06/03/25  0229 06/03/25  0130   PROBNP  --  3,045.0*   HSTROP T 264* 287*   PROTIME  --  16.6*   INR  --  1.29*        Assessment & Plan   Assessment / Plan     Brief Patient Summary:  Kash Huff is a 55 y.o. male with acute heart failure with reduced EF and severe CAD. He has severe mid RCA stenosis and sub-total occlusion of the proximal LAD with elevated filling pressures, PCWP was 22-24 mmHg.     Active Hospital Problems:  Active Hospital Problems    Diagnosis     **Acute hypoxic respiratory failure     Type 1 myocardial infarction     Acute on chronic HFrEF (heart failure with reduced ejection fraction)     Acute systolic congestive heart failure     NSTEMI (non-ST elevated myocardial infarction)            Plan:     I would continue with dual antiplatelet therapy as tolerated. Continue with diuresis, beta-blockers, Aldactone, Entresto and Jardiance as tolerated. Once he is euvolemic, will consider PCI of the RCA and LAD, likely Monday.        CODE STATUS:   Code Status (Patient has no pulse and is not breathing): CPR (Attempt to Resuscitate)  Medical Interventions (Patient has pulse or is breathing): Full Support  Level Of Support Discussed With: Patient      Electronically signed by Daniel Ybarra MD, 06/06/25, 8:29 AM EDT.

## 2025-06-06 NOTE — PLAN OF CARE
Goal Outcome Evaluation:  Plan of Care Reviewed With: patient        Progress: no change  Outcome Evaluation: voiding well, continue on heparin gtt, cath monday morning. no acute events noted, resting with family at bedside.

## 2025-06-06 NOTE — PROGRESS NOTES
[facility]   Hospitalist Progress Note  Date: 2025  Patient Name: Kash Huff  : 1970  MRN: [id number]  Date of admission: 6/3/2025  Room/Bed: [facility]      Subjective   Subjective     Chief Complaint: Shortness of breath    Summary:Kash Huff is a 55 y.o. male with hypertension, hyperlipidemia, type 2 diabetes who presents to the ED for evaluation of shortness of breath.  X-ray showed bilateral infiltrates concerning for pulmonary edema and suspected small bilateral pleural effusions.  Cardiology consulted.  Pulmonology consulted.  Patient's breathing is improved with diuresis.  Heart catheterization on .  During procedure patient went into V-fib and required cardioversion.  Cardiology planning repeat catheterization on Monday, .  Patient's breathing is much improved overall.    Interval Followup:     Patient does not have any new complaints    All systems reviewed and negative except for what is outlined above.      Objective   Objective     Vitals:   Temp:  [97.5 °F (36.4 °C)-98.6 °F (37 °C)] 98.6 °F (37 °C)  Heart Rate:  [69-89] 78  Resp:  [16-18] 18  BP: (103-127)/(61-83) 119/78    Physical Exam   General: NAD  Cardiovascular: Regular rate and rhythm  Pulmonary: Normal work of breathing  MSK: No lower extremity swelling  Psych: Mood and affect appropriate    Result Review    Result Review:  I have personally reviewed these results:  [x]  Laboratory      Lab 25  1056 25  0347 25  1052 25  0811 25  0306 25  1214 25  0542 25  1614 25  0855 25  0449 25  0211 25  0130   WBC  --  13.76*  --   --   --  19.32*  --  21.19*  --   --   --   --  15.91*   HEMOGLOBIN  --  10.9*  --   --   --  10.1*  --  9.9*  --   --   --   --  10.2*   HEMOGLOBIN, POC  --   --   --   --  12.2  --   --   --   --   --   --   --   --    HEMATOCRIT  --  37.1*  --   --   --  33.9*  --  33.1*  --   --   --   --  35.6*    HEMATOCRIT POC  --   --   --   --  36*  --   --   --   --   --   --   --   --    PLATELETS  --  222  --   --   --  212  --  224  --   --   --   --  224   NEUTROS ABS  --   --   --   --   --   --   --  17.21*  --   --   --   --  11.78*   IMMATURE GRANS (ABS)  --   --   --   --   --   --   --  0.10*  --   --   --   --  0.06*   LYMPHS ABS  --   --   --   --   --   --   --  2.52  --   --   --   --  2.92   MONOS ABS  --   --   --   --   --   --   --  1.33*  --   --   --   --  0.89   EOS ABS  --   --   --   --   --   --   --  0.00  --   --   --   --  0.17   MCV  --  67.6*  --   --   --  67.0*  --  67.6*  --   --   --   --  69.8*   PROCALCITONIN  --   --   --   --   --   --   --   --   --  0.07  --   --   --    LACTATE  --   --   --   --   --   --   --   --   --   --  2.0 2.6* 2.5*   PROTIME  --   --   --   --   --   --   --   --   --   --   --   --  16.6*   APTT 86.0 101.7* 65.0*   < >  --  81.3   < > 75.7*   < > 56.6* 110.9*  --  37.2*    < > = values in this interval not displayed.         Lab 06/06/25  0347 06/05/25  0306 06/04/25  0542 06/03/25  0130   SODIUM 138 138 142 138   POTASSIUM 4.0 3.3* 3.6 3.8   CHLORIDE 105 102 106 103   CO2 17.2* 20.4* 20.6* 17.4*   ANION GAP 15.8* 15.6* 15.4* 17.6*   BUN 23.0* 28.5* 21.8* 14.5   CREATININE 0.86 0.96 0.79 0.98   EGFR 103.5 94.5 106.2 92.2   GLUCOSE 113* 109* 117* 171*   CALCIUM 9.6 9.3 8.9 9.3   MAGNESIUM  --  1.9  --   --    HEMOGLOBIN A1C  --   --   --  5.60         Lab 06/03/25  0130   TOTAL PROTEIN 7.5   ALBUMIN 4.5   GLOBULIN 3.0   ALT (SGPT) 14   AST (SGOT) 22   BILIRUBIN 0.7   ALK PHOS 109         Lab 06/03/25  0229 06/03/25  0130   PROBNP  --  3,045.0*   HSTROP T 264* 287*   PROTIME  --  16.6*   INR  --  1.29*         Lab 06/03/25 0229   CHOLESTEROL 123   LDL CHOL 72   HDL CHOL 29*   TRIGLYCERIDES 117             Lab 06/05/25  0808 06/03/25  0211   PH, ARTERIAL 7.494* 7.357   PCO2, ARTERIAL 29.6* 31.3*   PO2 ART 61.2* 107.0*   O2 SATURATION ART 93.4* 98.0   FIO2   --  35   HCO3 ART 22.8 17.5*   BASE EXCESS ART 0.2 -7.0*     Brief Urine Lab Results       None          [x]  Microbiology   Microbiology Results (last 10 days)       Procedure Component Value - Date/Time    COVID-19, FLU A/B, RSV PCR 1 HR TAT - Swab, Nasopharynx [[id number]]  (Normal) Collected: 06/03/25 0253    Lab Status: Final result Specimen: Swab from Nasopharynx Updated: 06/03/25 0341     COVID19 Not Detected     Influenza A PCR Not Detected     Influenza B PCR Not Detected     RSV, PCR Not Detected    Narrative:      Fact sheet for providers: https://www.fda.gov/media/325788/download    Fact sheet for patients: https://www.fda.gov/media/233351/download    Test performed by PCR.    Blood Culture - Blood, Arm, Left [[id number]]  (Normal) Collected: 06/03/25 0131    Lab Status: Preliminary result Specimen: Blood from Arm, Left Updated: 06/06/25 0145     Blood Culture No growth at 3 days    Narrative:      Less than seven (7) mL's of blood was collected.  Insufficient quantity may yield false negative results.    Blood Culture - Blood, Arm, Right [[id number]]  (Normal) Collected: 06/03/25 0130    Lab Status: Preliminary result Specimen: Blood from Arm, Right Updated: 06/06/25 0145     Blood Culture No growth at 3 days    Narrative:      Less than seven (7) mL's of blood was collected.  Insufficient quantity may yield false negative results.          [x]  Radiology  XR Chest 1 View  Result Date: 6/6/2025  Impression: Interval clearing of pulmonary edema. No active disease. Electronically Signed: Dheeraj Chacon MD  6/6/2025 9:18 AM EDT  Workstation ID: [id number]    CT Angiogram Chest Pulmonary Embolism  Result Date: 6/3/2025  1.  No pulmonary embolism. 2.  No thoracic aortic aneurysm or dissection. 3.  Moderate-to-large bilateral pleural effusions are seen. 4.  There is borderline cardiac enlargement. The left ventricle appears dilated. 5.  There are coronary artery calcifications. 6.  Bilateral nodular groundglass  opacities are noted and are nonspecific. Infectious/inflammatory process is possible, as detailed above. 7.  Superimposed mild pulmonary edema is suspected. 8.  Bibasilar pneumonia and/or atelectasis is (are) suggested. 9.  Please see above comments for further detail.   Portions of this note were completed with a voice recognition program.  6/3/2025 4:12 AM by Sudarshan Valderrama MD on Workstation: [email or web address]      XR Chest 1 View  Result Date: 6/3/2025  Bilateral infiltrates are seen. The findings may represent pulmonary edema with vascular congestion. Infectious multifocal pneumonia cannot be excluded. There are suspected small bilateral pleural effusions. Please see above comments for further detail.    Portions of this note were completed with a voice recognition program.  6/3/2025 1:52 AM by Sudarshan Valderrama MD on Workstation: [email or web address]      []  EKG/Telemetry   []  Cardiology/Vascular   []  Pathology  []  Old records  []  Other:    Assessment & Plan        Assessment and Plan:    Pulmonary edema  bilateral pleural effusions  Heart failure with reduced ejection fraction  NSTEMI   Coronary artery disease  Hypertension  Hyperlipidemia  Type 2 diabetes mellitus  Metabolic acidosis     Plan  Admit to inpatient, telemetry  Continuous pulse ox  IV Lasix 80 mg twice daily  Continue heparin infusion  Aspirin 81 mg  Rosuvastatin  Metoprolol  Entresto  Brilinta  Aldactone  Jardiance  Brovana, Pulmicort  Sodium bicarb tabs  Bronchodilator protocol  DuoNebs every 6 hours as needed  Prednisone 40 mg p.o. daily for 4 days  Hypoglycemia protocol  sliding scale insulin  POC glucose         Discussed with RN.    VTE Prophylaxis:  Pharmacologic & mechanical VTE prophylaxis orders are present.        CODE STATUS:   Code Status (Patient has no pulse and is not breathing): CPR (Attempt to Resuscitate)  Medical Interventions (Patient has pulse or is breathing): Full Support  Level Of Support Discussed With: Patient      Electronically signed by  Leonidas Ashley MD, 6/6/2025, 14:38 EDT.

## 2025-06-07 LAB
ANION GAP SERPL CALCULATED.3IONS-SCNC: 18.1 MMOL/L (ref 5–15)
ANISOCYTOSIS BLD QL: NORMAL
APTT PPP: 102.3 SECONDS (ref 78–95.9)
APTT PPP: 95.4 SECONDS (ref 78–95.9)
APTT PPP: 96.8 SECONDS (ref 78–95.9)
BASOPHILS # BLD AUTO: 0.04 10*3/MM3 (ref 0–0.2)
BASOPHILS NFR BLD AUTO: 0.2 % (ref 0–1.5)
BUN SERPL-MCNC: 29.5 MG/DL (ref 6–20)
BUN/CREAT SERPL: 33.9 (ref 7–25)
CALCIUM SPEC-SCNC: 9.1 MG/DL (ref 8.6–10.5)
CHLORIDE SERPL-SCNC: 99 MMOL/L (ref 98–107)
CO2 SERPL-SCNC: 17.9 MMOL/L (ref 22–29)
CREAT SERPL-MCNC: 0.87 MG/DL (ref 0.76–1.27)
DEPRECATED RDW RBC AUTO: 46.3 FL (ref 37–54)
EGFRCR SERPLBLD CKD-EPI 2021: 103.2 ML/MIN/1.73
EOSINOPHIL # BLD AUTO: 0.03 10*3/MM3 (ref 0–0.4)
EOSINOPHIL NFR BLD AUTO: 0.2 % (ref 0.3–6.2)
ERYTHROCYTE [DISTWIDTH] IN BLOOD BY AUTOMATED COUNT: 20.5 % (ref 12.3–15.4)
GLUCOSE BLDC GLUCOMTR-MCNC: 128 MG/DL (ref 70–99)
GLUCOSE BLDC GLUCOMTR-MCNC: 184 MG/DL (ref 70–99)
GLUCOSE BLDC GLUCOMTR-MCNC: 188 MG/DL (ref 70–99)
GLUCOSE BLDC GLUCOMTR-MCNC: 272 MG/DL (ref 70–99)
GLUCOSE SERPL-MCNC: 107 MG/DL (ref 65–99)
HCT VFR BLD AUTO: 40.7 % (ref 37.5–51)
HGB BLD-MCNC: 12.1 G/DL (ref 13–17.7)
HYPOCHROMIA BLD QL: NORMAL
IMM GRANULOCYTES # BLD AUTO: 0.07 10*3/MM3 (ref 0–0.05)
IMM GRANULOCYTES NFR BLD AUTO: 0.4 % (ref 0–0.5)
LYMPHOCYTES # BLD AUTO: 4.04 10*3/MM3 (ref 0.7–3.1)
LYMPHOCYTES NFR BLD AUTO: 24.8 % (ref 19.6–45.3)
MCH RBC QN AUTO: 20.1 PG (ref 26.6–33)
MCHC RBC AUTO-ENTMCNC: 29.7 G/DL (ref 31.5–35.7)
MCV RBC AUTO: 67.5 FL (ref 79–97)
MICROCYTES BLD QL: NORMAL
MONOCYTES # BLD AUTO: 1.11 10*3/MM3 (ref 0.1–0.9)
MONOCYTES NFR BLD AUTO: 6.8 % (ref 5–12)
NEUTROPHILS NFR BLD AUTO: 10.99 10*3/MM3 (ref 1.7–7)
NEUTROPHILS NFR BLD AUTO: 67.6 % (ref 42.7–76)
NRBC BLD AUTO-RTO: 0 /100 WBC (ref 0–0.2)
PLAT MORPH BLD: NORMAL
PLATELET # BLD AUTO: 229 10*3/MM3 (ref 140–450)
PMV BLD AUTO: ABNORMAL FL
POTASSIUM SERPL-SCNC: 3.6 MMOL/L (ref 3.5–5.2)
RBC # BLD AUTO: 6.03 10*6/MM3 (ref 4.14–5.8)
SODIUM SERPL-SCNC: 135 MMOL/L (ref 136–145)
WBC MORPH BLD: NORMAL
WBC NRBC COR # BLD AUTO: 16.28 10*3/MM3 (ref 3.4–10.8)

## 2025-06-07 PROCEDURE — 99232 SBSQ HOSP IP/OBS MODERATE 35: CPT | Performed by: STUDENT IN AN ORGANIZED HEALTH CARE EDUCATION/TRAINING PROGRAM

## 2025-06-07 PROCEDURE — 25010000002 FUROSEMIDE PER 20 MG: Performed by: INTERNAL MEDICINE

## 2025-06-07 PROCEDURE — 94799 UNLISTED PULMONARY SVC/PX: CPT

## 2025-06-07 PROCEDURE — 80048 BASIC METABOLIC PNL TOTAL CA: CPT | Performed by: STUDENT IN AN ORGANIZED HEALTH CARE EDUCATION/TRAINING PROGRAM

## 2025-06-07 PROCEDURE — 25010000002 HEPARIN (PORCINE) 25000-0.45 UT/250ML-% SOLUTION: Performed by: INTERNAL MEDICINE

## 2025-06-07 PROCEDURE — 99233 SBSQ HOSP IP/OBS HIGH 50: CPT | Performed by: INTERNAL MEDICINE

## 2025-06-07 PROCEDURE — 63710000001 INSULIN REGULAR HUMAN PER 5 UNITS: Performed by: STUDENT IN AN ORGANIZED HEALTH CARE EDUCATION/TRAINING PROGRAM

## 2025-06-07 PROCEDURE — 85007 BL SMEAR W/DIFF WBC COUNT: CPT | Performed by: INTERNAL MEDICINE

## 2025-06-07 PROCEDURE — 85730 THROMBOPLASTIN TIME PARTIAL: CPT | Performed by: STUDENT IN AN ORGANIZED HEALTH CARE EDUCATION/TRAINING PROGRAM

## 2025-06-07 PROCEDURE — 82948 REAGENT STRIP/BLOOD GLUCOSE: CPT | Performed by: INTERNAL MEDICINE

## 2025-06-07 PROCEDURE — 99232 SBSQ HOSP IP/OBS MODERATE 35: CPT | Performed by: INTERNAL MEDICINE

## 2025-06-07 PROCEDURE — 85025 COMPLETE CBC W/AUTO DIFF WBC: CPT | Performed by: INTERNAL MEDICINE

## 2025-06-07 PROCEDURE — 82948 REAGENT STRIP/BLOOD GLUCOSE: CPT

## 2025-06-07 RX ORDER — SODIUM BICARBONATE 650 MG/1
650 TABLET ORAL 4 TIMES DAILY
Status: DISCONTINUED | OUTPATIENT
Start: 2025-06-07 | End: 2025-06-10 | Stop reason: HOSPADM

## 2025-06-07 RX ADMIN — ASPIRIN 81 MG: 81 TABLET, COATED ORAL at 08:54

## 2025-06-07 RX ADMIN — FUROSEMIDE 80 MG: 10 INJECTION, SOLUTION INTRAMUSCULAR; INTRAVENOUS at 17:41

## 2025-06-07 RX ADMIN — Medication 10 ML: at 20:28

## 2025-06-07 RX ADMIN — TICAGRELOR 90 MG: 90 TABLET ORAL at 20:27

## 2025-06-07 RX ADMIN — INSULIN HUMAN 4 UNITS: 100 INJECTION, SOLUTION PARENTERAL at 20:27

## 2025-06-07 RX ADMIN — SODIUM BICARBONATE 650 MG TABLET 650 MG: at 08:54

## 2025-06-07 RX ADMIN — ROSUVASTATIN CALCIUM 20 MG: 20 TABLET, FILM COATED ORAL at 20:27

## 2025-06-07 RX ADMIN — SACUBITRIL AND VALSARTAN 1 TABLET: 24; 26 TABLET, FILM COATED ORAL at 20:27

## 2025-06-07 RX ADMIN — SODIUM BICARBONATE 650 MG TABLET 650 MG: at 17:41

## 2025-06-07 RX ADMIN — SPIRONOLACTONE 25 MG: 25 TABLET ORAL at 08:54

## 2025-06-07 RX ADMIN — SODIUM BICARBONATE 650 MG TABLET 650 MG: at 20:27

## 2025-06-07 RX ADMIN — INSULIN HUMAN 2 UNITS: 100 INJECTION, SOLUTION PARENTERAL at 12:15

## 2025-06-07 RX ADMIN — SACUBITRIL AND VALSARTAN 1 TABLET: 24; 26 TABLET, FILM COATED ORAL at 08:54

## 2025-06-07 RX ADMIN — METOPROLOL SUCCINATE ER TABLETS 25 MG: 25 TABLET, FILM COATED, EXTENDED RELEASE ORAL at 08:54

## 2025-06-07 RX ADMIN — FUROSEMIDE 80 MG: 10 INJECTION, SOLUTION INTRAMUSCULAR; INTRAVENOUS at 08:54

## 2025-06-07 RX ADMIN — INSULIN HUMAN 2 UNITS: 100 INJECTION, SOLUTION PARENTERAL at 16:41

## 2025-06-07 RX ADMIN — Medication 10 ML: at 08:55

## 2025-06-07 RX ADMIN — TICAGRELOR 90 MG: 90 TABLET ORAL at 08:54

## 2025-06-07 RX ADMIN — EMPAGLIFLOZIN 10 MG: 10 TABLET, FILM COATED ORAL at 08:54

## 2025-06-07 RX ADMIN — HEPARIN SODIUM 19 UNITS/KG/HR: 10000 INJECTION, SOLUTION INTRAVENOUS at 13:34

## 2025-06-07 NOTE — PLAN OF CARE
Goal Outcome Evaluation:  Plan of Care Reviewed With: patient           Outcome Evaluation: Continues on heparin gtt. Breathing well. No complaints. BP soft, and MD notified.

## 2025-06-07 NOTE — PROGRESS NOTES
[facility]   Hospitalist Progress Note  Date: 2025  Patient Name: Kash Huff  : 1970  MRN: [id number]  Date of admission: 6/3/2025  Room/Bed: [facility]      Subjective   Subjective     Chief Complaint: Shortness of breath    Summary:Kash Huff is a 55 y.o. male with hypertension, hyperlipidemia, type 2 diabetes who presents to the ED for evaluation of shortness of breath.  X-ray showed bilateral infiltrates concerning for pulmonary edema and suspected small bilateral pleural effusions.  Cardiology consulted.  Pulmonology consulted.  Patient's breathing is improved with diuresis.  Heart catheterization on .  During procedure patient went into V-fib and required cardioversion.  Cardiology planning repeat catheterization on Monday, .  Patient's breathing is much improved overall.    Interval Followup:     No new complaints    All systems reviewed and negative except for what is outlined above.      Objective   Objective     Vitals:   Temp:  [97.5 °F (36.4 °C)-97.7 °F (36.5 °C)] 97.5 °F (36.4 °C)  Heart Rate:  [63-74] 66  Resp:  [18] 18  BP: (100-114)/(50-65) 102/50    Physical Exam   General: NAD  Cardiovascular: Normal S1, S2  Pulmonary: CTAB  MSK: No lower extremity swelling  Psych: Mood and affect appropriate    Result Review    Result Review:  I have personally reviewed these results:  [x]  Laboratory      Lab 25  0643 25  1729 25  1056 25  0347 25  1052 25  0811 25  0306 25  1214 25  0542 25  1614 25  0855 25  0449 25  0211 25  0130   WBC 16.28*  --   --  13.76*  --   --  19.32*  --  21.19*  --   --   --   --  15.91*   HEMOGLOBIN 12.1*  --   --  10.9*  --   --  10.1*  --  9.9*  --   --   --   --  10.2*   HEMOGLOBIN, POC  --   --   --   --   --  12.2  --   --   --   --   --   --   --   --    HEMATOCRIT 40.7  --   --  37.1*  --   --  33.9*  --  33.1*  --   --   --   --  35.6*   HEMATOCRIT POC  --    --   --   --   --  36*  --   --   --   --   --   --   --   --    PLATELETS 229  --   --  222  --   --  212  --  224  --   --   --   --  224   NEUTROS ABS 10.99*  --   --   --   --   --   --   --  17.21*  --   --   --   --  11.78*   IMMATURE GRANS (ABS) 0.07*  --   --   --   --   --   --   --  0.10*  --   --   --   --  0.06*   LYMPHS ABS 4.04*  --   --   --   --   --   --   --  2.52  --   --   --   --  2.92   MONOS ABS 1.11*  --   --   --   --   --   --   --  1.33*  --   --   --   --  0.89   EOS ABS 0.03  --   --   --   --   --   --   --  0.00  --   --   --   --  0.17   MCV 67.5*  --   --  67.6*  --   --  67.0*  --  67.6*  --   --   --   --  69.8*   PROCALCITONIN  --   --   --   --   --   --   --   --   --   --  0.07  --   --   --    LACTATE  --   --   --   --   --   --   --   --   --   --   --  2.0 2.6* 2.5*   PROTIME  --   --   --   --   --   --   --   --   --   --   --   --   --  16.6*   APTT 102.3* 83.1 86.0 101.7*   < >  --  81.3   < > 75.7*   < > 56.6* 110.9*  --  37.2*    < > = values in this interval not displayed.         Lab 06/07/25  0643 06/06/25  0347 06/05/25  0306 06/04/25  0542 06/03/25  0130   SODIUM 135* 138 138   < > 138   POTASSIUM 3.6 4.0 3.3*   < > 3.8   CHLORIDE 99 105 102   < > 103   CO2 17.9* 17.2* 20.4*   < > 17.4*   ANION GAP 18.1* 15.8* 15.6*   < > 17.6*   BUN 29.5* 23.0* 28.5*   < > 14.5   CREATININE 0.87 0.86 0.96   < > 0.98   EGFR 103.2 103.5 94.5   < > 92.2   GLUCOSE 107* 113* 109*   < > 171*   CALCIUM 9.1 9.6 9.3   < > 9.3   MAGNESIUM  --   --  1.9  --   --    HEMOGLOBIN A1C  --   --   --   --  5.60    < > = values in this interval not displayed.         Lab 06/03/25 0130   TOTAL PROTEIN 7.5   ALBUMIN 4.5   GLOBULIN 3.0   ALT (SGPT) 14   AST (SGOT) 22   BILIRUBIN 0.7   ALK PHOS 109         Lab 06/03/25 0229 06/03/25 0130   PROBNP  --  3,045.0*   HSTROP T 264* 287*   PROTIME  --  16.6*   INR  --  1.29*         Lab 06/03/25 0229   CHOLESTEROL 123   LDL CHOL 72   HDL CHOL 29*    TRIGLYCERIDES 117             Lab 06/05/25  0808 06/03/25  0211   PH, ARTERIAL 7.494* 7.357   PCO2, ARTERIAL 29.6* 31.3*   PO2 ART 61.2* 107.0*   O2 SATURATION ART 93.4* 98.0   FIO2  --  35   HCO3 ART 22.8 17.5*   BASE EXCESS ART 0.2 -7.0*     Brief Urine Lab Results       None          [x]  Microbiology   Microbiology Results (last 10 days)       Procedure Component Value - Date/Time    COVID-19, FLU A/B, RSV PCR 1 HR TAT - Swab, Nasopharynx [[id number]]  (Normal) Collected: 06/03/25 0253    Lab Status: Final result Specimen: Swab from Nasopharynx Updated: 06/03/25 0341     COVID19 Not Detected     Influenza A PCR Not Detected     Influenza B PCR Not Detected     RSV, PCR Not Detected    Narrative:      Fact sheet for providers: https://www.fda.gov/media/243723/download    Fact sheet for patients: https://www.fda.gov/media/860756/download    Test performed by PCR.    Blood Culture - Blood, Arm, Left [[id number]]  (Normal) Collected: 06/03/25 0131    Lab Status: Preliminary result Specimen: Blood from Arm, Left Updated: 06/07/25 0146     Blood Culture No growth at 4 days    Narrative:      Less than seven (7) mL's of blood was collected.  Insufficient quantity may yield false negative results.    Blood Culture - Blood, Arm, Right [[id number]]  (Normal) Collected: 06/03/25 0130    Lab Status: Preliminary result Specimen: Blood from Arm, Right Updated: 06/07/25 0146     Blood Culture No growth at 4 days    Narrative:      Less than seven (7) mL's of blood was collected.  Insufficient quantity may yield false negative results.          [x]  Radiology  XR Chest 1 View  Result Date: 6/6/2025  Impression: Interval clearing of pulmonary edema. No active disease. Electronically Signed: Dheeraj Chacon MD  6/6/2025 9:18 AM EDT  Workstation ID: [id number]    CT Angiogram Chest Pulmonary Embolism  Result Date: 6/3/2025  1.  No pulmonary embolism. 2.  No thoracic aortic aneurysm or dissection. 3.  Moderate-to-large bilateral pleural  effusions are seen. 4.  There is borderline cardiac enlargement. The left ventricle appears dilated. 5.  There are coronary artery calcifications. 6.  Bilateral nodular groundglass opacities are noted and are nonspecific. Infectious/inflammatory process is possible, as detailed above. 7.  Superimposed mild pulmonary edema is suspected. 8.  Bibasilar pneumonia and/or atelectasis is (are) suggested. 9.  Please see above comments for further detail.   Portions of this note were completed with a voice recognition program.  6/3/2025 4:12 AM by Sudarshan Valderrama MD on Workstation: [email or web address]      XR Chest 1 View  Result Date: 6/3/2025  Bilateral infiltrates are seen. The findings may represent pulmonary edema with vascular congestion. Infectious multifocal pneumonia cannot be excluded. There are suspected small bilateral pleural effusions. Please see above comments for further detail.    Portions of this note were completed with a voice recognition program.  6/3/2025 1:52 AM by Sudarshan Valderrama MD on Workstation: [email or web address]      []  EKG/Telemetry   []  Cardiology/Vascular   []  Pathology  []  Old records  []  Other:    Assessment & Plan        Assessment and Plan:    Pulmonary edema  bilateral pleural effusions  Heart failure with reduced ejection fraction  NSTEMI   Coronary artery disease  Hypertension  Hyperlipidemia  Type 2 diabetes mellitus  Metabolic acidosis     Plan  Admit to inpatient, telemetry  Continuous pulse ox  IV Lasix 80 mg twice daily  Continue heparin infusion  Aspirin 81 mg  Rosuvastatin  Metoprolol  Entresto  Brilinta  Aldactone  Jardiance  Brovana, Pulmicort  Sodium bicarb tabs  Bronchodilator protocol  DuoNebs every 6 hours as needed  Hypoglycemia protocol  Sliding scale insulin  POC glucose    Discussed with RN.    VTE Prophylaxis:  Pharmacologic & mechanical VTE prophylaxis orders are present.        CODE STATUS:   Code Status (Patient has no pulse and is not breathing): CPR (Attempt to Resuscitate)  Medical  Interventions (Patient has pulse or is breathing): Full Support  Level Of Support Discussed With: Patient      Electronically signed by Leonidas Ashley MD, 6/7/2025, 14:01 EDT.

## 2025-06-07 NOTE — PROGRESS NOTES
Pulmonary / Critical Care Progress Note      Patient Name: Kash Huff  : 1970  MRN: [id number]  Attending:  Leonidas Ashley MD  Date of admission: 6/3/2025    Subjective   Subjective   Follow-up for bilateral pleural effusions    Over past 24 hours:   Remains on heparin drip  Diuresing well  Dyspnea and orthopnea better  Going for left heart cath Monday  No fevers or chills        Objective   Objective     Vitals:   Temp:  [97.5 °F (36.4 °C)-97.7 °F (36.5 °C)] 97.5 °F (36.4 °C)  Heart Rate:  [63-74] 66  Resp:  [18] 18  BP: (100-114)/(50-65) 102/50    Physical Exam   Vital Signs Reviewed   General:  WDWN, Alert, NAD.  Male, lying in bed  HEENT:  PERRL, EOMI.  OP, nares clear  Chest: Good aeration; crackles improved bilaterally, no work of breathing noted  CV: RRR, no MGR, pulses 2+, equal.  Abd:  Soft, NT, ND, + BS  EXT:  no clubbing, no cyanosis, no edema  Neuro:  A&Ox3, CN grossly intact, no focal deficits.  Skin: No rashes or lesions noted      Result Review    Result Review:  I have personally reviewed the results from the time of this admission to 2025 12:58 EDT and agree with these findings:  [x]  Laboratory  []  Microbiology  [x]  Radiology  [x]  EKG/Telemetry   [x]  Cardiology/Vascular   []  Pathology  []  Old records  []  Other:  Most notable findings include:       Lab 25  0643 25  0347 25  0811 25  0306 25  0542 25  0130   WBC 16.28* 13.76*  --  19.32* 21.19* 15.91*   HEMOGLOBIN 12.1* 10.9*  --  10.1* 9.9* 10.2*   HEMOGLOBIN, POC  --   --  12.2  --   --   --    HEMATOCRIT 40.7 37.1*  --  33.9* 33.1* 35.6*   HEMATOCRIT POC  --   --  36*  --   --   --    PLATELETS 229 222  --  212 224 224   SODIUM 135* 138  --  138 142 138   POTASSIUM 3.6 4.0  --  3.3* 3.6 3.8   CHLORIDE 99 105  --  102 106 103   CO2 17.9* 17.2*  --  20.4* 20.6* 17.4*   BUN 29.5* 23.0*  --  28.5* 21.8* 14.5   CREATININE 0.87 0.86  --  0.96 0.79 0.98   GLUCOSE 107* 113*  --  109* 117* 171*  "  CALCIUM 9.1 9.6  --  9.3 8.9 9.3   TOTAL PROTEIN  --   --   --   --   --  7.5   ALBUMIN  --   --   --   --   --  4.5   GLOBULIN  --   --   --   --   --  3.0     CT Angiogram Chest Pulmonary Embolism  Result Date: 6/3/2025  CT ANGIOGRAM CHEST PULMONARY EMBOLISM-  Date of exam: 6/3/2025 3:44 AM.  Comparison: 6/3/2025.  INDICATIONS: 55-year-old male w/ possible pulmonary embolism; h/o SOA/SOB/shortness of air/shortness of breath (x 3 days).  TECHNIQUE: Axial CT images were obtained of the chest after the uneventful intravenous administration of 80 mL (or less) of Isovue-370 nonionic contrast agent. Reconstructed 2D coronal and sagittal images were also obtained. Automated exposure control and iterative construction methods were used. Additionally, the radiation dose reduction device was turned on for each scan per the ALARA (As Low as Reasonably Achievable) protocol. Please see the electronic medical record, EMR (i.e., Epic), for the documented radiation dose. As of the time of this interpretation, no 3D \"radial range\" reformation (and/or other type[s] of 3D reformations) is (are) provided for review. Please see the EMR (i.e., [employer]) for the documented dose of intravenous contrast agent as well as the radiation dose.  FINDINGS: No pulmonary embolism is seen. Bilateral nodular groundglass opacities are identified and are nonspecific. They measure less than 3 cm in greatest axial diameter. For instance, one of the largest such findings involves the lower lobe of the left lung and measures measures about 2.3 cm, as seen on image 34 of series 5, image 111 of series 7, image 176 of series 6, and adjacent images. No cavitation or mineralization is associated with the finding. These findings may represent an age-indeterminate infectious/inflammatory process. In the differential diagnosis would be septic emboli. Bilateral lower lobe atelectasis and/or pneumonia is (are) suspected, as well. Superimposed mild pulmonary edema " with vascular congestion is possible. There are moderate-to-large bilateral pleural effusions. Borderline cardiac enlargement is suspected. The left ventricle appears dilated. A trace amount of pericardial effusion is seen. Atherosclerotic changes are noted, including involvement of the coronary arteries. Chronic calcified granulomatous disease involves the chest. Grossly, the central tracheobronchial tree is well aerated without filling defect. There may be small-to-moderate probably reactive noncalcified mediastinal and hilar lymph nodes. No acute fracture. No aggressive osseous lesion. Please note that the entire inferior extent of the ribs is not included in the field-of-view. The upper abdomen is obscured by motion artifact. Grossly, no acute findings are seen.       Impression: 1.  No pulmonary embolism. 2.  No thoracic aortic aneurysm or dissection. 3.  Moderate-to-large bilateral pleural effusions are seen. 4.  There is borderline cardiac enlargement. The left ventricle appears dilated. 5.  There are coronary artery calcifications. 6.  Bilateral nodular groundglass opacities are noted and are nonspecific. Infectious/inflammatory process is possible, as detailed above. 7.  Superimposed mild pulmonary edema is suspected. 8.  Bibasilar pneumonia and/or atelectasis is (are) suggested. 9.  Please see above comments for further detail.   Portions of this note were completed with a voice recognition program.  6/3/2025 4:12 AM by Sudarshan Valderrama MD on Workstation: [employer]      Results for orders placed during the hospital encounter of 06/03/25    Adult Transthoracic Echo Complete W/ Cont if Necessary Per Protocol    Interpretation Summary    Left ventricular systolic function is moderately decreased. Calculated left ventricular EF = 25%    The left ventricular cavity is mildly dilated.    The following left ventricular wall segments are hypokinetic: mid anterior, apical lateral, mid inferolateral and mid anteroseptal.  The following left ventricular wall segments are akinetic: apical anterior, apical inferior, mid inferior, apical septal, mid inferoseptal and apex.    Left ventricular diastolic dysfunction is noted.    The left atrial cavity is moderately dilated.    Estimated right ventricular systolic pressure from tricuspid regurgitation is normal (<35 mmHg).    X-ray with clear lungs and pleural effusions resolved  BNP elevated      Assessment & Plan   Assessment / Plan     Active Hospital Problems:  Active Hospital Problems    Diagnosis     **Acute hypoxic respiratory failure     Type 1 myocardial infarction     Acute on chronic HFrEF (heart failure with reduced ejection fraction)     Acute systolic congestive heart failure     NSTEMI (non-ST elevated myocardial infarction)      Impression:  Bilateral pleural effusions  Acute cardiogenic pulmonary edema  Acute decompensated systolic congestive heart failure  Elevated proBNP  NSTEMI, likely type II secondary to demand ischemia  Lactic acidosis, resolved  Leukocytosis  Ongoing tobacco abuse of cigarettes  Heart failure with reduced EF, EF 25%     Plan:  No indication to drain pleural effusions at this time.  Radiographically and clinically improved with diuretics  Continue current diuretic regimen with IV Lasix 80 mg twice daily  Continue Entresto and Aldactone  Continue aspirin and brilinta  Trend renal panel and electrolytes  Echocardiogram with new EF dropped to 25%  Agree with cardiology plan for left heart cath Monday  Heparin drip for now per cardiology  Status post cath which showed severe mid RCA stenosis, normal left main, subtotal occlusion of proximal LAD.  Continue Brovana, Pulmicort, DuoNeb as needed  Continue bronchopulmonary hygiene  Encourage activity and incentive spirometer use    VTE Prophylaxis:  Pharmacologic & mechanical VTE prophylaxis orders are present.    CODE STATUS:   Code Status (Patient has no pulse and is not breathing): CPR (Attempt to  Resuscitate)  Medical Interventions (Patient has pulse or is breathing): Full Support  Level Of Support Discussed With: Patient      Labs, imaging, microbiology, notes and medications personally reviewed  Discussed with primary    Electronically signed by Farhat Warren MD, 06/07/25, 12:58 PM EDT.

## 2025-06-07 NOTE — PROGRESS NOTES
INTERVENTIONAL CARDIOLOGY  INPATIENT PROGRESS NOTE        PATIENT IDENTIFICATION:  Name:  Kash Huff        MRN:  [id number]  55 y.o.  male            Chief Complain:   Chest pain    SUBJECTIVE:   No acute events overnight.    OBJECTIVE:  Vitals:    06/06/25 2350 06/07/25 0350 06/07/25 0635 06/07/25 0755   BP: 102/63 110/61  113/62   BP Location: Left arm Left arm  Left arm   Patient Position: Lying Lying  Lying   Pulse: 70 63  66   Resp: 18 18  18   Temp: 97.7 °F (36.5 °C) 97.7 °F (36.5 °C)  97.5 °F (36.4 °C)   TempSrc: Oral Oral  Oral   SpO2: 99% 98%  98%   Weight:   72.9 kg (160 lb 11.5 oz)    Height:               Body mass index is 25.17 kg/m².    Intake/Output Summary (Last 24 hours) at 6/7/2025 1137  Last data filed at 6/7/2025 1000  Gross per 24 hour   Intake 720 ml   Output 3875 ml   Net -3155 ml         Physical Exam  General : Alert, awake, no acute distress  Neck : No jugular venous distention  CVS : Regular rate and rhythm, no murmur  Lungs: Clear to auscultation bilaterally, no crackles or rhonchi  Abdomen: Soft, nontender  Extremities: Warm, well-perfused, no pretibial edema        No Known Allergies  Scheduled meds:  arformoterol, 15 mcg, Nebulization, BID - RT  aspirin, 81 mg, Oral, Daily  budesonide, 0.5 mg, Nebulization, BID - RT  empagliflozin, 10 mg, Oral, Daily  furosemide, 80 mg, Intravenous, BID Diuretics  insulin regular, 2-7 Units, Subcutaneous, 4x Daily AC & at Bedtime  metoprolol succinate XL, 25 mg, Oral, Q24H  rosuvastatin, 20 mg, Oral, Nightly  sacubitril-valsartan, 1 tablet, Oral, Q12H  sodium bicarbonate, 650 mg, Oral, 4x Daily  sodium chloride, 10 mL, Intravenous, Q12H  spironolactone, 25 mg, Oral, Daily  ticagrelor, 90 mg, Oral, BID      IV meds:                      heparin, 12 Units/kg/hr, Last Rate: 19 Units/kg/hr (06/07/25 0853)        Data Review:  CBC          6/5/2025    03:06 6/5/2025    08:11 6/6/2025    03:47 6/7/2025    06:43   CBC   WBC 19.32   13.76  16.28   "  RBC 5.06   5.49  6.03    Hemoglobin 10.1  12.2  10.9  12.1    Hematocrit 33.9  36  37.1  40.7    MCV 67.0   67.6  67.5    MCH 20.0   19.9  20.1    MCHC 29.8   29.4  29.7    RDW 20.1   20.1  20.5    Platelets 212   222  229      CMP          6/5/2025    03:06 6/6/2025    03:47 6/7/2025    06:43   CMP   Glucose 109  113  107    BUN 28.5  23.0  29.5    Creatinine 0.96  0.86  0.87    EGFR 94.5  103.5  103.2    Sodium 138  138  135    Potassium 3.3  4.0  3.6    Chloride 102  105  99    Calcium 9.3  9.6  9.1    BUN/Creatinine Ratio 29.7  26.7  33.9    Anion Gap 15.6  15.8  18.1       CARDIAC LABS:      Lab 06/03/25  0229 06/03/25  0130   PROBNP  --  3,045.0*   HSTROP T 264* 287*   PROTIME  --  16.6*   INR  --  1.29*        No results found for: \"DIGOXIN\"   No results found for: \"TSH\"  Results from last 7 days   Lab Units 06/03/25  0229   CHOLESTEROL mg/dL 123   HDL CHOL mg/dL 29*     No results found for: \"POCTROP\"  Lab Results   Component Value Date    TROPONINT 264 (C) 06/03/2025   (  Lab Results   Component Value Date    MG 1.9 06/05/2025     Results for orders placed during the hospital encounter of 06/03/25    Adult Transthoracic Echo Complete W/ Cont if Necessary Per Protocol    Interpretation Summary    Left ventricular systolic function is moderately decreased. Calculated left ventricular EF = 25%    The left ventricular cavity is mildly dilated.    The following left ventricular wall segments are hypokinetic: mid anterior, apical lateral, mid inferolateral and mid anteroseptal. The following left ventricular wall segments are akinetic: apical anterior, apical inferior, mid inferior, apical septal, mid inferoseptal and apex.    Left ventricular diastolic dysfunction is noted.    The left atrial cavity is moderately dilated.    Estimated right ventricular systolic pressure from tricuspid regurgitation is normal (<35 mmHg).           ASSESSMENT:    Acute hypoxic respiratory failure    Acute systolic congestive " heart failure    NSTEMI (non-ST elevated myocardial infarction)    Type 1 myocardial infarction    Acute on chronic HFrEF (heart failure with reduced ejection fraction)        PLAN:  - Continue dual antiplatelet therapy.  Patient is planned for PCI of RCA and LAD likely on Monday with Dr. Ybarra.  - Replace potassium to keep above 4, magnesium above 2.  - Continue heparin drip.  - Will follow up.         Robin Mancuso MD, [facility]  6/7/2025    11:37 EDT         I spent 30 minutes caring for this patient on this date of service. This time includes time spent by me in the following activities:preparing for the visit, reviewing tests, obtaining and/or reviewing a separately obtained history, performing a medically appropriate examination and/or evaluation , counseling and educating the patient/family/caregiver, ordering medications, tests, or procedures, referring and communicating with other health care professionals , documenting information in the medical record, independently interpreting results and communicating that information with the patient/family/caregiver, and care coordination. The patient was seen and examined. Work by the provider also included review and/or ordering of lab tests, review and/or ordering of radiology tests, review and/or ordering of medicine tests, discussion with other physicians or providers, independent review of data, obtaining old records, review/summation of old records, and/or other review.

## 2025-06-08 LAB
ANION GAP SERPL CALCULATED.3IONS-SCNC: 16.7 MMOL/L (ref 5–15)
APTT PPP: 100.9 SECONDS (ref 78–95.9)
APTT PPP: 86 SECONDS (ref 78–95.9)
APTT PPP: 90.3 SECONDS (ref 78–95.9)
BACTERIA SPEC AEROBE CULT: NORMAL
BACTERIA SPEC AEROBE CULT: NORMAL
BUN SERPL-MCNC: 31.6 MG/DL (ref 6–20)
BUN/CREAT SERPL: 32.6 (ref 7–25)
CALCIUM SPEC-SCNC: 9.5 MG/DL (ref 8.6–10.5)
CHLORIDE SERPL-SCNC: 99 MMOL/L (ref 98–107)
CO2 SERPL-SCNC: 19.3 MMOL/L (ref 22–29)
CREAT SERPL-MCNC: 0.97 MG/DL (ref 0.76–1.27)
DEPRECATED RDW RBC AUTO: 44.6 FL (ref 37–54)
EGFRCR SERPLBLD CKD-EPI 2021: 93.3 ML/MIN/1.73
ERYTHROCYTE [DISTWIDTH] IN BLOOD BY AUTOMATED COUNT: 20.1 % (ref 12.3–15.4)
GLUCOSE BLDC GLUCOMTR-MCNC: 143 MG/DL (ref 70–99)
GLUCOSE BLDC GLUCOMTR-MCNC: 159 MG/DL (ref 70–99)
GLUCOSE BLDC GLUCOMTR-MCNC: 176 MG/DL (ref 70–99)
GLUCOSE BLDC GLUCOMTR-MCNC: 188 MG/DL (ref 70–99)
GLUCOSE SERPL-MCNC: 140 MG/DL (ref 65–99)
HCT VFR BLD AUTO: 40.4 % (ref 37.5–51)
HGB BLD-MCNC: 12.1 G/DL (ref 13–17.7)
MAGNESIUM SERPL-MCNC: 2.5 MG/DL (ref 1.6–2.6)
MCH RBC QN AUTO: 19.9 PG (ref 26.6–33)
MCHC RBC AUTO-ENTMCNC: 30 G/DL (ref 31.5–35.7)
MCV RBC AUTO: 66.6 FL (ref 79–97)
PLATELET # BLD AUTO: 227 10*3/MM3 (ref 140–450)
PMV BLD AUTO: ABNORMAL FL
POTASSIUM SERPL-SCNC: 3.8 MMOL/L (ref 3.5–5.2)
QT INTERVAL: 343 MS
QT INTERVAL: 397 MS
QT INTERVAL: 454 MS
QTC INTERVAL: 458 MS
QTC INTERVAL: 514 MS
QTC INTERVAL: 575 MS
RBC # BLD AUTO: 6.07 10*6/MM3 (ref 4.14–5.8)
SODIUM SERPL-SCNC: 135 MMOL/L (ref 136–145)
WBC NRBC COR # BLD AUTO: 13.84 10*3/MM3 (ref 3.4–10.8)

## 2025-06-08 PROCEDURE — 85730 THROMBOPLASTIN TIME PARTIAL: CPT | Performed by: STUDENT IN AN ORGANIZED HEALTH CARE EDUCATION/TRAINING PROGRAM

## 2025-06-08 PROCEDURE — 85027 COMPLETE CBC AUTOMATED: CPT | Performed by: STUDENT IN AN ORGANIZED HEALTH CARE EDUCATION/TRAINING PROGRAM

## 2025-06-08 PROCEDURE — 82948 REAGENT STRIP/BLOOD GLUCOSE: CPT

## 2025-06-08 PROCEDURE — 83735 ASSAY OF MAGNESIUM: CPT | Performed by: STUDENT IN AN ORGANIZED HEALTH CARE EDUCATION/TRAINING PROGRAM

## 2025-06-08 PROCEDURE — 63710000001 INSULIN LISPRO (HUMAN) PER 5 UNITS: Performed by: STUDENT IN AN ORGANIZED HEALTH CARE EDUCATION/TRAINING PROGRAM

## 2025-06-08 PROCEDURE — 82948 REAGENT STRIP/BLOOD GLUCOSE: CPT | Performed by: INTERNAL MEDICINE

## 2025-06-08 PROCEDURE — 25010000002 FUROSEMIDE PER 20 MG: Performed by: INTERNAL MEDICINE

## 2025-06-08 PROCEDURE — 25010000002 HEPARIN (PORCINE) 25000-0.45 UT/250ML-% SOLUTION: Performed by: INTERNAL MEDICINE

## 2025-06-08 PROCEDURE — 99233 SBSQ HOSP IP/OBS HIGH 50: CPT | Performed by: INTERNAL MEDICINE

## 2025-06-08 PROCEDURE — 99232 SBSQ HOSP IP/OBS MODERATE 35: CPT | Performed by: INTERNAL MEDICINE

## 2025-06-08 PROCEDURE — 99232 SBSQ HOSP IP/OBS MODERATE 35: CPT | Performed by: STUDENT IN AN ORGANIZED HEALTH CARE EDUCATION/TRAINING PROGRAM

## 2025-06-08 PROCEDURE — 80048 BASIC METABOLIC PNL TOTAL CA: CPT | Performed by: STUDENT IN AN ORGANIZED HEALTH CARE EDUCATION/TRAINING PROGRAM

## 2025-06-08 PROCEDURE — 63710000001 INSULIN REGULAR HUMAN PER 5 UNITS: Performed by: STUDENT IN AN ORGANIZED HEALTH CARE EDUCATION/TRAINING PROGRAM

## 2025-06-08 RX ORDER — POTASSIUM CHLORIDE 750 MG/1
40 CAPSULE, EXTENDED RELEASE ORAL ONCE
Status: COMPLETED | OUTPATIENT
Start: 2025-06-08 | End: 2025-06-08

## 2025-06-08 RX ORDER — INSULIN LISPRO 100 [IU]/ML
2-9 INJECTION, SOLUTION INTRAVENOUS; SUBCUTANEOUS
Status: DISCONTINUED | OUTPATIENT
Start: 2025-06-08 | End: 2025-06-10 | Stop reason: HOSPADM

## 2025-06-08 RX ADMIN — HEPARIN SODIUM 17 UNITS/KG/HR: 10000 INJECTION, SOLUTION INTRAVENOUS at 06:07

## 2025-06-08 RX ADMIN — METOPROLOL SUCCINATE ER TABLETS 25 MG: 25 TABLET, FILM COATED, EXTENDED RELEASE ORAL at 08:26

## 2025-06-08 RX ADMIN — TICAGRELOR 90 MG: 90 TABLET ORAL at 20:11

## 2025-06-08 RX ADMIN — Medication 10 ML: at 08:27

## 2025-06-08 RX ADMIN — POTASSIUM CHLORIDE 40 MEQ: 750 CAPSULE, EXTENDED RELEASE ORAL at 08:26

## 2025-06-08 RX ADMIN — SODIUM BICARBONATE 650 MG TABLET 650 MG: at 20:11

## 2025-06-08 RX ADMIN — Medication 10 ML: at 20:11

## 2025-06-08 RX ADMIN — INSULIN LISPRO 2 UNITS: 100 INJECTION, SOLUTION INTRAVENOUS; SUBCUTANEOUS at 20:11

## 2025-06-08 RX ADMIN — SACUBITRIL AND VALSARTAN 1 TABLET: 24; 26 TABLET, FILM COATED ORAL at 20:11

## 2025-06-08 RX ADMIN — FUROSEMIDE 80 MG: 10 INJECTION, SOLUTION INTRAMUSCULAR; INTRAVENOUS at 17:31

## 2025-06-08 RX ADMIN — ASPIRIN 81 MG: 81 TABLET, COATED ORAL at 08:26

## 2025-06-08 RX ADMIN — SPIRONOLACTONE 25 MG: 25 TABLET ORAL at 08:26

## 2025-06-08 RX ADMIN — EMPAGLIFLOZIN 10 MG: 10 TABLET, FILM COATED ORAL at 08:27

## 2025-06-08 RX ADMIN — POTASSIUM CHLORIDE 40 MEQ: 750 CAPSULE, EXTENDED RELEASE ORAL at 13:54

## 2025-06-08 RX ADMIN — SODIUM BICARBONATE 650 MG TABLET 650 MG: at 11:56

## 2025-06-08 RX ADMIN — INSULIN HUMAN 2 UNITS: 100 INJECTION, SOLUTION PARENTERAL at 08:26

## 2025-06-08 RX ADMIN — SACUBITRIL AND VALSARTAN 1 TABLET: 24; 26 TABLET, FILM COATED ORAL at 08:26

## 2025-06-08 RX ADMIN — ROSUVASTATIN CALCIUM 20 MG: 20 TABLET, FILM COATED ORAL at 20:11

## 2025-06-08 RX ADMIN — INSULIN LISPRO 2 UNITS: 100 INJECTION, SOLUTION INTRAVENOUS; SUBCUTANEOUS at 16:47

## 2025-06-08 RX ADMIN — FUROSEMIDE 80 MG: 10 INJECTION, SOLUTION INTRAMUSCULAR; INTRAVENOUS at 08:27

## 2025-06-08 RX ADMIN — SODIUM BICARBONATE 650 MG TABLET 650 MG: at 17:31

## 2025-06-08 RX ADMIN — SODIUM BICARBONATE 650 MG TABLET 650 MG: at 08:26

## 2025-06-08 RX ADMIN — TICAGRELOR 90 MG: 90 TABLET ORAL at 08:26

## 2025-06-08 NOTE — PLAN OF CARE
Goal Outcome Evaluation:  Plan of Care Reviewed With: patient           Outcome Evaluation: Ambulated in the barens with spouse. No c/o pain.

## 2025-06-08 NOTE — PROGRESS NOTES
Pulmonary / Critical Care Progress Note      Patient Name: Kash Huff  : 1970  MRN: [id number]  Attending:  Leonidas Ashley MD  Date of admission: 6/3/2025    Subjective   Subjective   Follow-up for bilateral pleural effusions    No acute event overnight    This morning,  Currently on room air  Reports feeling well today  No fever or chills  Denies cough  Denies any shortness of breath    Objective   Objective     Vitals:   Temp:  [97.5 °F (36.4 °C)-98.2 °F (36.8 °C)] 97.5 °F (36.4 °C)  Heart Rate:  [63-73] 73  Resp:  [17-18] 18  BP: ()/(52-67) 112/65    Physical Exam   Vital Signs Reviewed   General:  WDWN, Alert, NAD.  Male, lying in bed  HEENT:  PERRL, EOMI.  OP, nares clear  Chest: Good aeration; crackles improved bilaterally, no work of breathing noted  CV: RRR, no MGR, pulses 2+, equal.  Abd:  Soft, NT, ND, + BS  EXT:  no clubbing, no cyanosis, no edema  Neuro:  A&Ox3, CN grossly intact, no focal deficits.  Skin: No rashes or lesions noted      Result Review    Result Review:  I have personally reviewed the results from the time of this admission to 2025 15:57 EDT and agree with these findings:  [x]  Laboratory  []  Microbiology  [x]  Radiology  [x]  EKG/Telemetry   [x]  Cardiology/Vascular   []  Pathology  []  Old records  []  Other:  Most notable findings include:       Lab 25  0425 25  0643 25  0347 25  0811 25  0306 25  0542 25  0130   WBC 13.84* 16.28* 13.76*  --  19.32* 21.19* 15.91*   HEMOGLOBIN 12.1* 12.1* 10.9*  --  10.1* 9.9* 10.2*   HEMOGLOBIN, POC  --   --   --  12.2  --   --   --    HEMATOCRIT 40.4 40.7 37.1*  --  33.9* 33.1* 35.6*   HEMATOCRIT POC  --   --   --  36*  --   --   --    PLATELETS 227 229 222  --  212 224 224   SODIUM 135* 135* 138  --  138 142 138   POTASSIUM 3.8 3.6 4.0  --  3.3* 3.6 3.8   CHLORIDE 99 99 105  --  102 106 103   CO2 19.3* 17.9* 17.2*  --  20.4* 20.6* 17.4*   BUN 31.6* 29.5* 23.0*  --  28.5* 21.8* 14.5  "  CREATININE 0.97 0.87 0.86  --  0.96 0.79 0.98   GLUCOSE 140* 107* 113*  --  109* 117* 171*   CALCIUM 9.5 9.1 9.6  --  9.3 8.9 9.3   TOTAL PROTEIN  --   --   --   --   --   --  7.5   ALBUMIN  --   --   --   --   --   --  4.5   GLOBULIN  --   --   --   --   --   --  3.0     CT Angiogram Chest Pulmonary Embolism  Result Date: 6/3/2025  CT ANGIOGRAM CHEST PULMONARY EMBOLISM-  Date of exam: 6/3/2025 3:44 AM.  Comparison: 6/3/2025.  INDICATIONS: 55-year-old male w/ possible pulmonary embolism; h/o SOA/SOB/shortness of air/shortness of breath (x 3 days).  TECHNIQUE: Axial CT images were obtained of the chest after the uneventful intravenous administration of 80 mL (or less) of Isovue-370 nonionic contrast agent. Reconstructed 2D coronal and sagittal images were also obtained. Automated exposure control and iterative construction methods were used. Additionally, the radiation dose reduction device was turned on for each scan per the ALARA (As Low as Reasonably Achievable) protocol. Please see the electronic medical record, EMR (i.e., Epic), for the documented radiation dose. As of the time of this interpretation, no 3D \"radial range\" reformation (and/or other type[s] of 3D reformations) is (are) provided for review. Please see the EMR (i.e., [employer]) for the documented dose of intravenous contrast agent as well as the radiation dose.  FINDINGS: No pulmonary embolism is seen. Bilateral nodular groundglass opacities are identified and are nonspecific. They measure less than 3 cm in greatest axial diameter. For instance, one of the largest such findings involves the lower lobe of the left lung and measures measures about 2.3 cm, as seen on image 34 of series 5, image 111 of series 7, image 176 of series 6, and adjacent images. No cavitation or mineralization is associated with the finding. These findings may represent an age-indeterminate infectious/inflammatory process. In the differential diagnosis would be septic emboli. " Bilateral lower lobe atelectasis and/or pneumonia is (are) suspected, as well. Superimposed mild pulmonary edema with vascular congestion is possible. There are moderate-to-large bilateral pleural effusions. Borderline cardiac enlargement is suspected. The left ventricle appears dilated. A trace amount of pericardial effusion is seen. Atherosclerotic changes are noted, including involvement of the coronary arteries. Chronic calcified granulomatous disease involves the chest. Grossly, the central tracheobronchial tree is well aerated without filling defect. There may be small-to-moderate probably reactive noncalcified mediastinal and hilar lymph nodes. No acute fracture. No aggressive osseous lesion. Please note that the entire inferior extent of the ribs is not included in the field-of-view. The upper abdomen is obscured by motion artifact. Grossly, no acute findings are seen.       Impression: 1.  No pulmonary embolism. 2.  No thoracic aortic aneurysm or dissection. 3.  Moderate-to-large bilateral pleural effusions are seen. 4.  There is borderline cardiac enlargement. The left ventricle appears dilated. 5.  There are coronary artery calcifications. 6.  Bilateral nodular groundglass opacities are noted and are nonspecific. Infectious/inflammatory process is possible, as detailed above. 7.  Superimposed mild pulmonary edema is suspected. 8.  Bibasilar pneumonia and/or atelectasis is (are) suggested. 9.  Please see above comments for further detail.   Portions of this note were completed with a voice recognition program.  6/3/2025 4:12 AM by Sudarshan Valderrama MD on Workstation: [id number]      Results for orders placed during the hospital encounter of 06/03/25    Adult Transthoracic Echo Complete W/ Cont if Necessary Per Protocol    Interpretation Summary    Left ventricular systolic function is moderately decreased. Calculated left ventricular EF = 25%    The left ventricular cavity is mildly dilated.    The following left  ventricular wall segments are hypokinetic: mid anterior, apical lateral, mid inferolateral and mid anteroseptal. The following left ventricular wall segments are akinetic: apical anterior, apical inferior, mid inferior, apical septal, mid inferoseptal and apex.    Left ventricular diastolic dysfunction is noted.    The left atrial cavity is moderately dilated.    Estimated right ventricular systolic pressure from tricuspid regurgitation is normal (<35 mmHg).    X-ray with clear lungs and pleural effusions resolved  BNP elevated      Assessment & Plan   Assessment / Plan     Active Hospital Problems:  Active Hospital Problems    Diagnosis     **Acute hypoxic respiratory failure     Type 1 myocardial infarction     Acute on chronic HFrEF (heart failure with reduced ejection fraction)     Acute systolic congestive heart failure     NSTEMI (non-ST elevated myocardial infarction)      Impression:  Bilateral pleural effusions  Acute cardiogenic pulmonary edema  Acute decompensated systolic congestive heart failure  Elevated proBNP  NSTEMI, likely type II secondary to demand ischemia  Lactic acidosis, resolved  Leukocytosis  Hypokalemia  Hyponatremia, clinically insignificant  Ongoing tobacco abuse of cigarettes  Heart failure with reduced EF, EF 25%     Plan:  No indication to drain pleural effusions at this time.  Radiographically and clinically improved with diuretics  Continue IV Lasix 80 mg twice daily  Trend renal panel and electrolytes.  Replace potassium orally  Continue Entresto and Aldactone  Continue aspirin and brilinta  Echocardiogram with new EF dropped to 25%  Heparin drip for now per cardiology  Status post cath which showed severe mid RCA stenosis, normal left main, subtotal occlusion of proximal LAD.  Going to left heart cath tomorrow with cardiology for intervention of RCA stenosis  Continue Brovana, Pulmicort, DuoNeb as needed  Continue bronchopulmonary hygiene  Encourage activity and incentive  spirometer use    VTE Prophylaxis:  Pharmacologic & mechanical VTE prophylaxis orders are present.    CODE STATUS:   Code Status (Patient has no pulse and is not breathing): CPR (Attempt to Resuscitate)  Medical Interventions (Patient has pulse or is breathing): Full Support  Level Of Support Discussed With: Patient      Labs, imaging, microbiology, notes and medications personally reviewed  Discussed with primary    I, Dr. Farhat Warren, have spent more than 50% of the total time managing the patient in this encounter today.  This included personally reviewing all pertinent labs, imaging, microbiology and documentation. Also discussing the case with the patient and any available family, the admitting physician and any available ancillary staff.    Electronically signed by GEETHA Mg, 06/08/25, 9:40 AM EDT.  Electronically signed by Farhat Warren MD, 06/08/25, 3:57 PM EDT.

## 2025-06-08 NOTE — PROGRESS NOTES
INTERVENTIONAL CARDIOLOGY  INPATIENT PROGRESS NOTE        PATIENT IDENTIFICATION:  Name:  Kash Huff        MRN:  [id number]  55 y.o.  male            Chief Complain:   Chest pain    SUBJECTIVE:   No acute events overnight.    OBJECTIVE:  Vitals:    06/07/25 2149 06/07/25 2243 06/08/25 0255 06/08/25 0724   BP:  115/67 106/60 114/66   BP Location:  Left arm Left arm Left arm   Patient Position:  Lying Lying Lying   Pulse: 63 73 73 69   Resp: 17 18 18 18   Temp:  97.7 °F (36.5 °C) 97.9 °F (36.6 °C) 98.1 °F (36.7 °C)   TempSrc:  Oral Oral Oral   SpO2: 98% 100% 98% 99%   Weight:   71.3 kg (157 lb 3 oz)    Height:               Body mass index is 24.62 kg/m².    Intake/Output Summary (Last 24 hours) at 6/8/2025 1046  Last data filed at 6/8/2025 0900  Gross per 24 hour   Intake 480 ml   Output 3025 ml   Net -2545 ml         Physical Exam  General : Alert, awake, no acute distress  Neck : No jugular venous distention  CVS : Regular rate and rhythm, no murmur  Lungs: Clear to auscultation bilaterally, no crackles or rhonchi  Abdomen: Soft, nontender  Extremities: Warm, well-perfused, no pretibial edema        No Known Allergies  Scheduled meds:  arformoterol, 15 mcg, Nebulization, BID - RT  aspirin, 81 mg, Oral, Daily  budesonide, 0.5 mg, Nebulization, BID - RT  empagliflozin, 10 mg, Oral, Daily  furosemide, 80 mg, Intravenous, BID Diuretics  insulin regular, 2-7 Units, Subcutaneous, 4x Daily AC & at Bedtime  metoprolol succinate XL, 25 mg, Oral, Q24H  rosuvastatin, 20 mg, Oral, Nightly  sacubitril-valsartan, 1 tablet, Oral, Q12H  sodium bicarbonate, 650 mg, Oral, 4x Daily  sodium chloride, 10 mL, Intravenous, Q12H  spironolactone, 25 mg, Oral, Daily  ticagrelor, 90 mg, Oral, BID      IV meds:                      heparin, 12 Units/kg/hr, Last Rate: 17 Units/kg/hr (06/08/25 0607)        Data Review:  CBC          6/6/2025    03:47 6/7/2025    06:43 6/8/2025    04:25   CBC   WBC 13.76  16.28  13.84    RBC 5.49  6.03   "6.07    Hemoglobin 10.9  12.1  12.1    Hematocrit 37.1  40.7  40.4    MCV 67.6  67.5  66.6    MCH 19.9  20.1  19.9    MCHC 29.4  29.7  30.0    RDW 20.1  20.5  20.1    Platelets 222  229  227      CMP          6/6/2025    03:47 6/7/2025    06:43 6/8/2025    04:25   CMP   Glucose 113  107  140    BUN 23.0  29.5  31.6    Creatinine 0.86  0.87  0.97    EGFR 103.5  103.2  93.3    Sodium 138  135  135    Potassium 4.0  3.6  3.8    Chloride 105  99  99    Calcium 9.6  9.1  9.5    BUN/Creatinine Ratio 26.7  33.9  32.6    Anion Gap 15.8  18.1  16.7       CARDIAC LABS:      Lab 06/03/25  0229 06/03/25  0130   PROBNP  --  3,045.0*   HSTROP T 264* 287*   PROTIME  --  16.6*   INR  --  1.29*        No results found for: \"DIGOXIN\"   No results found for: \"TSH\"  Results from last 7 days   Lab Units 06/03/25  0229   CHOLESTEROL mg/dL 123   HDL CHOL mg/dL 29*     No results found for: \"POCTROP\"  Lab Results   Component Value Date    TROPONINT 264 (C) 06/03/2025   (  Lab Results   Component Value Date    MG 2.5 06/08/2025     Results for orders placed during the hospital encounter of 06/03/25    Adult Transthoracic Echo Complete W/ Cont if Necessary Per Protocol    Interpretation Summary    Left ventricular systolic function is moderately decreased. Calculated left ventricular EF = 25%    The left ventricular cavity is mildly dilated.    The following left ventricular wall segments are hypokinetic: mid anterior, apical lateral, mid inferolateral and mid anteroseptal. The following left ventricular wall segments are akinetic: apical anterior, apical inferior, mid inferior, apical septal, mid inferoseptal and apex.    Left ventricular diastolic dysfunction is noted.    The left atrial cavity is moderately dilated.    Estimated right ventricular systolic pressure from tricuspid regurgitation is normal (<35 mmHg).           ASSESSMENT:    Acute hypoxic respiratory failure    Acute systolic congestive heart failure    NSTEMI (non-ST " elevated myocardial infarction)    Type 1 myocardial infarction    Acute on chronic HFrEF (heart failure with reduced ejection fraction)        PLAN:  - Continue dual antiplatelet therapy.  Patient is planned for PCI of RCA and LAD likely on Monday with Dr. Ybarra.  - Replace potassium to keep above 4, magnesium above 2.  - Continue heparin drip.  - N.p.o. past midnight.  - Will follow up.         Robin Mancuso MD, [facility]  6/8/2025    10:46 EDT         I spent 30 minutes caring for this patient on this date of service. This time includes time spent by me in the following activities:preparing for the visit, reviewing tests, obtaining and/or reviewing a separately obtained history, performing a medically appropriate examination and/or evaluation , counseling and educating the patient/family/caregiver, ordering medications, tests, or procedures, referring and communicating with other health care professionals , documenting information in the medical record, independently interpreting results and communicating that information with the patient/family/caregiver, and care coordination. The patient was seen and examined. Work by the provider also included review and/or ordering of lab tests, review and/or ordering of radiology tests, review and/or ordering of medicine tests, discussion with other physicians or providers, independent review of data, obtaining old records, review/summation of old records, and/or other review.

## 2025-06-08 NOTE — PROGRESS NOTES
[facility]   Hospitalist Progress Note  Date: 2025  Patient Name: Ksah Huff  : 1970  MRN: [id number]  Date of admission: 6/3/2025  Room/Bed: [facility]      Subjective   Subjective     Chief Complaint: Shortness of breath    Summary:Kash Huff is a 55 y.o. male with hypertension, hyperlipidemia, type 2 diabetes who presents to the ED for evaluation of shortness of breath.  X-ray showed bilateral infiltrates concerning for pulmonary edema and suspected small bilateral pleural effusions.  Cardiology consulted.  Pulmonology consulted.  Patient's breathing is improved with diuresis.  Heart catheterization on .  During procedure patient went into V-fib and required cardioversion.  Cardiology planning repeat catheterization on Monday, .  Patient's breathing is much improved overall.    Interval Followup:     Does not have any new complaints    All systems reviewed and negative except for what is outlined above.      Objective   Objective     Vitals:   Temp:  [97.5 °F (36.4 °C)-98.1 °F (36.7 °C)] 98.1 °F (36.7 °C)  Heart Rate:  [63-73] 69  Resp:  [17-18] 18  BP: (100-115)/(50-67) 114/66    Physical Exam   General: NAD  Cardiovascular: Regular rate and rhythm  Pulmonary: Normal work of breathing  MSK: No lower extremity swelling  Psych: Mood and affect appropriate    Result Review    Result Review:  I have personally reviewed these results:  [x]  Laboratory      Lab 25  0425 25  2143 25  1509 25  0643 25  1056 25  0347 25  1214 25  0542 25  1614 25  0855 25  0449 25  0211 25  0130   WBC 13.84*  --   --  16.28*  --  13.76*   < > 21.19*  --   --   --   --  15.91*   HEMOGLOBIN 12.1*  --   --  12.1*  --  10.9*   < > 9.9*  --   --   --   --  10.2*   HEMOGLOBIN, POC  --   --   --   --   --   --    < >  --   --   --   --   --   --    HEMATOCRIT 40.4  --   --  40.7  --  37.1*   < > 33.1*  --   --   --   --  35.6*    HEMATOCRIT POC  --   --   --   --   --   --    < >  --   --   --   --   --   --    PLATELETS 227  --   --  229  --  222   < > 224  --   --   --   --  224   NEUTROS ABS  --   --   --  10.99*  --   --   --  17.21*  --   --   --   --  11.78*   IMMATURE GRANS (ABS)  --   --   --  0.07*  --   --   --  0.10*  --   --   --   --  0.06*   LYMPHS ABS  --   --   --  4.04*  --   --   --  2.52  --   --   --   --  2.92   MONOS ABS  --   --   --  1.11*  --   --   --  1.33*  --   --   --   --  0.89   EOS ABS  --   --   --  0.03  --   --   --  0.00  --   --   --   --  0.17   MCV 66.6*  --   --  67.5*  --  67.6*   < > 67.6*  --   --   --   --  69.8*   PROCALCITONIN  --   --   --   --   --   --   --   --   --  0.07  --   --   --    LACTATE  --   --   --   --   --   --   --   --   --   --  2.0 2.6* 2.5*   PROTIME  --   --   --   --   --   --   --   --   --   --   --   --  16.6*   APTT 100.9* 96.8* 95.4 102.3*   < > 101.7*   < > 75.7*   < > 56.6* 110.9*  --  37.2*    < > = values in this interval not displayed.         Lab 06/08/25  0425 06/07/25  0643 06/06/25  0347 06/05/25  0306 06/04/25  0542 06/03/25  0130   SODIUM 135* 135* 138 138   < > 138   POTASSIUM 3.8 3.6 4.0 3.3*   < > 3.8   CHLORIDE 99 99 105 102   < > 103   CO2 19.3* 17.9* 17.2* 20.4*   < > 17.4*   ANION GAP 16.7* 18.1* 15.8* 15.6*   < > 17.6*   BUN 31.6* 29.5* 23.0* 28.5*   < > 14.5   CREATININE 0.97 0.87 0.86 0.96   < > 0.98   EGFR 93.3 103.2 103.5 94.5   < > 92.2   GLUCOSE 140* 107* 113* 109*   < > 171*   CALCIUM 9.5 9.1 9.6 9.3   < > 9.3   MAGNESIUM 2.5  --   --  1.9  --   --    HEMOGLOBIN A1C  --   --   --   --   --  5.60    < > = values in this interval not displayed.         Lab 06/03/25 0130   TOTAL PROTEIN 7.5   ALBUMIN 4.5   GLOBULIN 3.0   ALT (SGPT) 14   AST (SGOT) 22   BILIRUBIN 0.7   ALK PHOS 109         Lab 06/03/25 0229 06/03/25 0130   PROBNP  --  3,045.0*   HSTROP T 264* 287*   PROTIME  --  16.6*   INR  --  1.29*         Lab 06/03/25 0229    CHOLESTEROL 123   LDL CHOL 72   HDL CHOL 29*   TRIGLYCERIDES 117             Lab 06/05/25  0808 06/03/25  0211   PH, ARTERIAL 7.494* 7.357   PCO2, ARTERIAL 29.6* 31.3*   PO2 ART 61.2* 107.0*   O2 SATURATION ART 93.4* 98.0   FIO2  --  35   HCO3 ART 22.8 17.5*   BASE EXCESS ART 0.2 -7.0*     Brief Urine Lab Results       None          [x]  Microbiology   Microbiology Results (last 10 days)       Procedure Component Value - Date/Time    COVID-19, FLU A/B, RSV PCR 1 HR TAT - Swab, Nasopharynx [[id number]]  (Normal) Collected: 06/03/25 0253    Lab Status: Final result Specimen: Swab from Nasopharynx Updated: 06/03/25 0341     COVID19 Not Detected     Influenza A PCR Not Detected     Influenza B PCR Not Detected     RSV, PCR Not Detected    Narrative:      Fact sheet for providers: https://www.fda.gov/media/242398/download    Fact sheet for patients: https://www.fda.gov/media/934443/download    Test performed by PCR.    Blood Culture - Blood, Arm, Left [[id number]]  (Normal) Collected: 06/03/25 0131    Lab Status: Final result Specimen: Blood from Arm, Left Updated: 06/08/25 0146     Blood Culture No growth at 5 days    Narrative:      Less than seven (7) mL's of blood was collected.  Insufficient quantity may yield false negative results.    Blood Culture - Blood, Arm, Right [[id number]]  (Normal) Collected: 06/03/25 0130    Lab Status: Final result Specimen: Blood from Arm, Right Updated: 06/08/25 0146     Blood Culture No growth at 5 days    Narrative:      Less than seven (7) mL's of blood was collected.  Insufficient quantity may yield false negative results.          [x]  Radiology  XR Chest 1 View  Result Date: 6/6/2025  Impression: Interval clearing of pulmonary edema. No active disease. Electronically Signed: Dheeraj Chacon MD  6/6/2025 9:18 AM EDT  Workstation ID: [id number]    CT Angiogram Chest Pulmonary Embolism  Result Date: 6/3/2025  1.  No pulmonary embolism. 2.  No thoracic aortic aneurysm or dissection. 3.   Moderate-to-large bilateral pleural effusions are seen. 4.  There is borderline cardiac enlargement. The left ventricle appears dilated. 5.  There are coronary artery calcifications. 6.  Bilateral nodular groundglass opacities are noted and are nonspecific. Infectious/inflammatory process is possible, as detailed above. 7.  Superimposed mild pulmonary edema is suspected. 8.  Bibasilar pneumonia and/or atelectasis is (are) suggested. 9.  Please see above comments for further detail.   Portions of this note were completed with a voice recognition program.  6/3/2025 4:12 AM by Sudarshan Valderrama MD on Workstation: [email or web address]      XR Chest 1 View  Result Date: 6/3/2025  Bilateral infiltrates are seen. The findings may represent pulmonary edema with vascular congestion. Infectious multifocal pneumonia cannot be excluded. There are suspected small bilateral pleural effusions. Please see above comments for further detail.    Portions of this note were completed with a voice recognition program.  6/3/2025 1:52 AM by Sudarshan Valderrama MD on Workstation: [email or web address]      []  EKG/Telemetry   []  Cardiology/Vascular   []  Pathology  []  Old records  []  Other:    Assessment & Plan        Assessment and Plan:    #Heart failure with reduced ejection fraction  #NSTEMI   #Coronary artery disease  Cardiology following  IV Lasix 80 mg twice daily  Continue heparin infusion  Aspirin  Statin  Metoprolol  Entresto  Brilinta  Aldactone  Jardiance    #Hypertension  #Hyperlipidemia  Medications as above    #Type 2 diabetes mellitus  Hypoglycemia protocol  Sliding scale insulin  POC glucose    #Metabolic acidosis-improving  Sodium bicarb tabs    Brovana and Pulmicort     Medical course will dictate further management    Discussed with RN.    VTE Prophylaxis:  Pharmacologic & mechanical VTE prophylaxis orders are present.        CODE STATUS:   Code Status (Patient has no pulse and is not breathing): CPR (Attempt to Resuscitate)  Medical Interventions  (Patient has pulse or is breathing): Full Support  Level Of Support Discussed With: Patient      Electronically signed by Leonidas Ashley MD, 6/8/2025, 11:10 EDT.

## 2025-06-08 NOTE — PLAN OF CARE
Goal Outcome Evaluation:      Remains on heparin gtt. No changes. Plan for heart cath Monday.

## 2025-06-09 LAB
ACT BLD: 498 SECONDS (ref 89–137)
ANION GAP SERPL CALCULATED.3IONS-SCNC: 14.1 MMOL/L (ref 5–15)
APTT PPP: 115.2 SECONDS (ref 78–95.9)
BUN SERPL-MCNC: 26.7 MG/DL (ref 6–20)
BUN/CREAT SERPL: 27.5 (ref 7–25)
CALCIUM SPEC-SCNC: 9.7 MG/DL (ref 8.6–10.5)
CHLORIDE SERPL-SCNC: 101 MMOL/L (ref 98–107)
CO2 SERPL-SCNC: 19.9 MMOL/L (ref 22–29)
CREAT SERPL-MCNC: 0.97 MG/DL (ref 0.76–1.27)
DEPRECATED RDW RBC AUTO: 45.2 FL (ref 37–54)
EGFRCR SERPLBLD CKD-EPI 2021: 93.3 ML/MIN/1.73
ERYTHROCYTE [DISTWIDTH] IN BLOOD BY AUTOMATED COUNT: 20.3 % (ref 12.3–15.4)
GLUCOSE BLDC GLUCOMTR-MCNC: 146 MG/DL (ref 70–99)
GLUCOSE BLDC GLUCOMTR-MCNC: 153 MG/DL (ref 70–99)
GLUCOSE BLDC GLUCOMTR-MCNC: 186 MG/DL (ref 70–99)
GLUCOSE BLDC GLUCOMTR-MCNC: 190 MG/DL (ref 70–99)
GLUCOSE SERPL-MCNC: 150 MG/DL (ref 65–99)
HCT VFR BLD AUTO: 40.4 % (ref 37.5–51)
HGB BLD-MCNC: 12 G/DL (ref 13–17.7)
MAGNESIUM SERPL-MCNC: 2.5 MG/DL (ref 1.6–2.6)
MCH RBC QN AUTO: 20 PG (ref 26.6–33)
MCHC RBC AUTO-ENTMCNC: 29.7 G/DL (ref 31.5–35.7)
MCV RBC AUTO: 67.2 FL (ref 79–97)
PLATELET # BLD AUTO: 222 10*3/MM3 (ref 140–450)
PMV BLD AUTO: ABNORMAL FL
POTASSIUM SERPL-SCNC: 4.5 MMOL/L (ref 3.5–5.2)
QT INTERVAL: 418 MS
QTC INTERVAL: 462 MS
RBC # BLD AUTO: 6.01 10*6/MM3 (ref 4.14–5.8)
SODIUM SERPL-SCNC: 135 MMOL/L (ref 136–145)
WBC NRBC COR # BLD AUTO: 11.61 10*3/MM3 (ref 3.4–10.8)

## 2025-06-09 PROCEDURE — 83735 ASSAY OF MAGNESIUM: CPT | Performed by: STUDENT IN AN ORGANIZED HEALTH CARE EDUCATION/TRAINING PROGRAM

## 2025-06-09 PROCEDURE — C1769 GUIDE WIRE: HCPCS | Performed by: INTERNAL MEDICINE

## 2025-06-09 PROCEDURE — 25010000002 BIVALIRUDIN TRIFLUOROACETATE 250 MG RECONSTITUTED SOLUTION 1 EACH VIAL: Performed by: INTERNAL MEDICINE

## 2025-06-09 PROCEDURE — C1874 STENT, COATED/COV W/DEL SYS: HCPCS | Performed by: INTERNAL MEDICINE

## 2025-06-09 PROCEDURE — 25010000002 HEPARIN (PORCINE) PER 1000 UNITS: Performed by: INTERNAL MEDICINE

## 2025-06-09 PROCEDURE — 99232 SBSQ HOSP IP/OBS MODERATE 35: CPT | Performed by: STUDENT IN AN ORGANIZED HEALTH CARE EDUCATION/TRAINING PROGRAM

## 2025-06-09 PROCEDURE — 93005 ELECTROCARDIOGRAM TRACING: CPT | Performed by: INTERNAL MEDICINE

## 2025-06-09 PROCEDURE — C1887 CATHETER, GUIDING: HCPCS | Performed by: INTERNAL MEDICINE

## 2025-06-09 PROCEDURE — 25510000001 IOPAMIDOL PER 1 ML: Performed by: INTERNAL MEDICINE

## 2025-06-09 PROCEDURE — 25010000002 LIDOCAINE 2% SOLUTION: Performed by: INTERNAL MEDICINE

## 2025-06-09 PROCEDURE — 92943 PRQ TRLUML REVSC CH OCC ANT: CPT | Performed by: INTERNAL MEDICINE

## 2025-06-09 PROCEDURE — 94799 UNLISTED PULMONARY SVC/PX: CPT

## 2025-06-09 PROCEDURE — C1725 CATH, TRANSLUMIN NON-LASER: HCPCS | Performed by: INTERNAL MEDICINE

## 2025-06-09 PROCEDURE — 25010000002 FUROSEMIDE PER 20 MG: Performed by: INTERNAL MEDICINE

## 2025-06-09 PROCEDURE — C9600 PERC DRUG-EL COR STENT SING: HCPCS | Performed by: INTERNAL MEDICINE

## 2025-06-09 PROCEDURE — 63710000001 INSULIN LISPRO (HUMAN) PER 5 UNITS: Performed by: INTERNAL MEDICINE

## 2025-06-09 PROCEDURE — 85347 COAGULATION TIME ACTIVATED: CPT

## 2025-06-09 PROCEDURE — 82948 REAGENT STRIP/BLOOD GLUCOSE: CPT

## 2025-06-09 PROCEDURE — 25810000003 SODIUM CHLORIDE 0.9 % SOLUTION: Performed by: INTERNAL MEDICINE

## 2025-06-09 PROCEDURE — 85730 THROMBOPLASTIN TIME PARTIAL: CPT | Performed by: STUDENT IN AN ORGANIZED HEALTH CARE EDUCATION/TRAINING PROGRAM

## 2025-06-09 PROCEDURE — 94761 N-INVAS EAR/PLS OXIMETRY MLT: CPT

## 2025-06-09 PROCEDURE — 027136Z DILATION OF CORONARY ARTERY, TWO ARTERIES WITH THREE DRUG-ELUTING INTRALUMINAL DEVICES, PERCUTANEOUS APPROACH: ICD-10-PCS | Performed by: INTERNAL MEDICINE

## 2025-06-09 PROCEDURE — 25010000002 MIDAZOLAM PER 1MG: Performed by: INTERNAL MEDICINE

## 2025-06-09 PROCEDURE — 92928 PRQ TCAT PLMT NTRAC ST 1 LES: CPT | Performed by: INTERNAL MEDICINE

## 2025-06-09 PROCEDURE — 25010000002 FENTANYL CITRATE (PF) 50 MCG/ML SOLUTION: Performed by: INTERNAL MEDICINE

## 2025-06-09 PROCEDURE — C9607 PERC D-E COR REVASC CHRO SIN: HCPCS | Performed by: INTERNAL MEDICINE

## 2025-06-09 PROCEDURE — 25010000002 HEPARIN (PORCINE) 25000-0.45 UT/250ML-% SOLUTION: Performed by: INTERNAL MEDICINE

## 2025-06-09 PROCEDURE — C1894 INTRO/SHEATH, NON-LASER: HCPCS | Performed by: INTERNAL MEDICINE

## 2025-06-09 PROCEDURE — 80048 BASIC METABOLIC PNL TOTAL CA: CPT | Performed by: STUDENT IN AN ORGANIZED HEALTH CARE EDUCATION/TRAINING PROGRAM

## 2025-06-09 PROCEDURE — 85027 COMPLETE CBC AUTOMATED: CPT | Performed by: STUDENT IN AN ORGANIZED HEALTH CARE EDUCATION/TRAINING PROGRAM

## 2025-06-09 PROCEDURE — 99232 SBSQ HOSP IP/OBS MODERATE 35: CPT | Performed by: INTERNAL MEDICINE

## 2025-06-09 PROCEDURE — 82948 REAGENT STRIP/BLOOD GLUCOSE: CPT | Performed by: INTERNAL MEDICINE

## 2025-06-09 DEVICE — XIENCE SKYPOINT™ EVEROLIMUS ELUTING CORONARY STENT SYSTEM 4.00 MM X 18 MM / RAPID-EXCHANGE
Type: IMPLANTABLE DEVICE | Status: FUNCTIONAL
Brand: XIENCE SKYPOINT™

## 2025-06-09 DEVICE — XIENCE SKYPOINT™ EVEROLIMUS ELUTING CORONARY STENT SYSTEM 2.50 MM X 15 MM / RAPID-EXCHANGE
Type: IMPLANTABLE DEVICE | Status: FUNCTIONAL
Brand: XIENCE SKYPOINT™

## 2025-06-09 DEVICE — XIENCE SKYPOINT™ EVEROLIMUS ELUTING CORONARY STENT SYSTEM 3.00 MM X 23 MM / RAPID-EXCHANGE
Type: IMPLANTABLE DEVICE | Status: FUNCTIONAL
Brand: XIENCE SKYPOINT™

## 2025-06-09 RX ORDER — ASPIRIN 81 MG/1
81 TABLET ORAL DAILY
Status: DISCONTINUED | OUTPATIENT
Start: 2025-06-10 | End: 2025-06-10 | Stop reason: HOSPADM

## 2025-06-09 RX ORDER — MIDAZOLAM HYDROCHLORIDE 2 MG/2ML
INJECTION, SOLUTION INTRAMUSCULAR; INTRAVENOUS
Status: DISCONTINUED | OUTPATIENT
Start: 2025-06-09 | End: 2025-06-09 | Stop reason: HOSPADM

## 2025-06-09 RX ORDER — LIDOCAINE HYDROCHLORIDE 20 MG/ML
INJECTION, SOLUTION INFILTRATION; PERINEURAL
Status: DISCONTINUED | OUTPATIENT
Start: 2025-06-09 | End: 2025-06-09 | Stop reason: HOSPADM

## 2025-06-09 RX ORDER — SODIUM CHLORIDE 9 MG/ML
100 INJECTION, SOLUTION INTRAVENOUS CONTINUOUS
Status: ACTIVE | OUTPATIENT
Start: 2025-06-09 | End: 2025-06-09

## 2025-06-09 RX ORDER — TICAGRELOR 90 MG/1
90 TABLET, FILM COATED ORAL 2 TIMES DAILY
Status: DISCONTINUED | OUTPATIENT
Start: 2025-06-09 | End: 2025-06-09

## 2025-06-09 RX ORDER — PHENYLEPHRINE HCL IN 0.9% NACL 1 MG/10 ML
SYRINGE (ML) INTRAVENOUS
Status: DISCONTINUED | OUTPATIENT
Start: 2025-06-09 | End: 2025-06-09 | Stop reason: HOSPADM

## 2025-06-09 RX ORDER — ACETAMINOPHEN 325 MG/1
650 TABLET ORAL EVERY 4 HOURS PRN
Status: DISCONTINUED | OUTPATIENT
Start: 2025-06-09 | End: 2025-06-10 | Stop reason: HOSPADM

## 2025-06-09 RX ORDER — DIAPER,BRIEF,INFANT-TODD,DISP
1 EACH MISCELLANEOUS ONCE
Status: COMPLETED | OUTPATIENT
Start: 2025-06-09 | End: 2025-06-09

## 2025-06-09 RX ORDER — FENTANYL CITRATE 50 UG/ML
INJECTION, SOLUTION INTRAMUSCULAR; INTRAVENOUS
Status: DISCONTINUED | OUTPATIENT
Start: 2025-06-09 | End: 2025-06-09 | Stop reason: HOSPADM

## 2025-06-09 RX ORDER — HEPARIN SODIUM 1000 [USP'U]/ML
INJECTION, SOLUTION INTRAVENOUS; SUBCUTANEOUS
Status: DISCONTINUED | OUTPATIENT
Start: 2025-06-09 | End: 2025-06-09 | Stop reason: HOSPADM

## 2025-06-09 RX ORDER — ONDANSETRON 2 MG/ML
4 INJECTION INTRAMUSCULAR; INTRAVENOUS EVERY 6 HOURS PRN
Status: DISCONTINUED | OUTPATIENT
Start: 2025-06-09 | End: 2025-06-10 | Stop reason: HOSPADM

## 2025-06-09 RX ORDER — TICAGRELOR 90 MG/1
90 TABLET, FILM COATED ORAL 2 TIMES DAILY
Status: DISCONTINUED | OUTPATIENT
Start: 2025-06-09 | End: 2025-06-10 | Stop reason: HOSPADM

## 2025-06-09 RX ORDER — NITROGLYCERIN 0.4 MG/1
0.4 TABLET SUBLINGUAL
Status: DISCONTINUED | OUTPATIENT
Start: 2025-06-09 | End: 2025-06-10 | Stop reason: HOSPADM

## 2025-06-09 RX ORDER — FUROSEMIDE 10 MG/ML
40 INJECTION INTRAMUSCULAR; INTRAVENOUS
Status: DISCONTINUED | OUTPATIENT
Start: 2025-06-09 | End: 2025-06-10 | Stop reason: HOSPADM

## 2025-06-09 RX ORDER — VERAPAMIL HYDROCHLORIDE 2.5 MG/ML
INJECTION INTRAVENOUS
Status: DISCONTINUED | OUTPATIENT
Start: 2025-06-09 | End: 2025-06-09 | Stop reason: HOSPADM

## 2025-06-09 RX ORDER — ONDANSETRON 4 MG/1
4 TABLET, ORALLY DISINTEGRATING ORAL EVERY 6 HOURS PRN
Status: DISCONTINUED | OUTPATIENT
Start: 2025-06-09 | End: 2025-06-10 | Stop reason: HOSPADM

## 2025-06-09 RX ORDER — ASPIRIN 81 MG/1
81 TABLET ORAL DAILY
Status: DISCONTINUED | OUTPATIENT
Start: 2025-06-09 | End: 2025-06-09

## 2025-06-09 RX ORDER — IOPAMIDOL 755 MG/ML
INJECTION, SOLUTION INTRAVASCULAR
Status: DISCONTINUED | OUTPATIENT
Start: 2025-06-09 | End: 2025-06-09 | Stop reason: HOSPADM

## 2025-06-09 RX ADMIN — SPIRONOLACTONE 25 MG: 25 TABLET ORAL at 12:48

## 2025-06-09 RX ADMIN — FUROSEMIDE 80 MG: 10 INJECTION, SOLUTION INTRAMUSCULAR; INTRAVENOUS at 08:29

## 2025-06-09 RX ADMIN — METOPROLOL SUCCINATE ER TABLETS 25 MG: 25 TABLET, FILM COATED, EXTENDED RELEASE ORAL at 12:46

## 2025-06-09 RX ADMIN — SACUBITRIL AND VALSARTAN 1 TABLET: 24; 26 TABLET, FILM COATED ORAL at 12:47

## 2025-06-09 RX ADMIN — FUROSEMIDE 40 MG: 10 INJECTION, SOLUTION INTRAMUSCULAR; INTRAVENOUS at 17:30

## 2025-06-09 RX ADMIN — TICAGRELOR 90 MG: 90 TABLET ORAL at 08:29

## 2025-06-09 RX ADMIN — Medication 10 ML: at 08:30

## 2025-06-09 RX ADMIN — TICAGRELOR 90 MG: 90 TABLET ORAL at 20:47

## 2025-06-09 RX ADMIN — HEPARIN SODIUM 17 UNITS/KG/HR: 10000 INJECTION, SOLUTION INTRAVENOUS at 01:52

## 2025-06-09 RX ADMIN — Medication 10 ML: at 20:48

## 2025-06-09 RX ADMIN — SODIUM CHLORIDE 100 ML/HR: 9 INJECTION, SOLUTION INTRAVENOUS at 12:48

## 2025-06-09 RX ADMIN — SACUBITRIL AND VALSARTAN 1 TABLET: 24; 26 TABLET, FILM COATED ORAL at 20:47

## 2025-06-09 RX ADMIN — SODIUM BICARBONATE 650 MG TABLET 650 MG: at 12:49

## 2025-06-09 RX ADMIN — INSULIN LISPRO 2 UNITS: 100 INJECTION, SOLUTION INTRAVENOUS; SUBCUTANEOUS at 17:30

## 2025-06-09 RX ADMIN — ROSUVASTATIN CALCIUM 20 MG: 20 TABLET, FILM COATED ORAL at 20:47

## 2025-06-09 RX ADMIN — SODIUM BICARBONATE 650 MG TABLET 650 MG: at 20:47

## 2025-06-09 RX ADMIN — SODIUM BICARBONATE 650 MG TABLET 650 MG: at 17:30

## 2025-06-09 RX ADMIN — ASPIRIN 81 MG: 81 TABLET, COATED ORAL at 08:29

## 2025-06-09 RX ADMIN — BACITRACIN 0.9 G: 500 OINTMENT TOPICAL at 15:19

## 2025-06-09 RX ADMIN — INSULIN LISPRO 2 UNITS: 100 INJECTION, SOLUTION INTRAVENOUS; SUBCUTANEOUS at 12:46

## 2025-06-09 RX ADMIN — EMPAGLIFLOZIN 10 MG: 10 TABLET, FILM COATED ORAL at 12:47

## 2025-06-09 NOTE — CONSULTS
Cardiac Rehab Phase I - Occurrence #1    Education Topics:  Cardiac Rehab yes     Topic Components:  Exercise yes     Teaching Aids:  Phase 2 Brochure yes     Teaching Method:  Written Instruction yes     Teaching Recipient:  Patient yes       Recovery/Discharge Instructions:  Cardiac Rehab Phase 2 yes       Patient Qualification:  Cardiac Rehab Phase 2 yes     Previously completed   [employer] Intermediate Repair Preamble Text (Leave Blank If You Do Not Want): Undermining was performed with blunt dissection.

## 2025-06-09 NOTE — PLAN OF CARE
Goal Outcome Evaluation:  Plan of Care Reviewed With: patient, spouse           Outcome Evaluation: No acute events. TR band removed. Pt tolerated well. Care ongoing.

## 2025-06-09 NOTE — PROGRESS NOTES
[facility]     Cardiology Progress Note    Patient Name: Kash Huff  : 1970  MRN: [id number]  Primary Care Physician:  Brock Davison PA  Date of admission: 6/3/2025    Subjective   Subjective     Chief Complaint: The patient is better and denies dyspnea after diuresis over the weekend. Denies chest pain.    Interval HPI:    Patient remains in normal sinus rhythm.     Review of Systems   All systems were reviewed and negative except for: fatigue    Objective   Objective     Vitals:   Temp:  [97.5 °F (36.4 °C)-98.2 °F (36.8 °C)] 98.1 °F (36.7 °C)  Heart Rate:  [68-81] 81  Resp:  [16-18] 16  BP: ()/(49-71) 94/49  Physical Exam      Aelrt  Heart. Regular, no gallops, no rubs.   Lungs; No rales, no wheezing  LE: no edema  Neurologic. No motor defiicits.    Scheduled Meds:arformoterol, 15 mcg, Nebulization, BID - RT  aspirin, 81 mg, Oral, Daily  aspirin, 81 mg, Oral, Daily  budesonide, 0.5 mg, Nebulization, BID - RT  empagliflozin, 10 mg, Oral, Daily  furosemide, 40 mg, Intravenous, BID Diuretics  insulin lispro, 2-9 Units, Subcutaneous, 4x Daily AC & at Bedtime  metoprolol succinate XL, 25 mg, Oral, Q24H  rosuvastatin, 20 mg, Oral, Nightly  sacubitril-valsartan, 1 tablet, Oral, Q12H  sodium bicarbonate, 650 mg, Oral, 4x Daily  sodium chloride, 10 mL, Intravenous, Q12H  spironolactone, 25 mg, Oral, Daily  ticagrelor, 90 mg, Oral, BID  ticagrelor, 90 mg, Oral, BID      Continuous Infusions:sodium chloride, 100 mL/hr           Result Review    Result Review:  I have personally reviewed the results from the time of this admission to 2025 12:30 EDT and agree with these findings:  [x]  Laboratory  []  Microbiology  [x]  Radiology  [x]  EKG/Telemetry   [x]  Cardiology/Vascular   []  Pathology  []  Old records  []  Other:  Most notable findings include:     CBC          2025    06:43 2025    04:25 2025    05:42   CBC   WBC 16.28  13.84  11.61    RBC 6.03  6.07  6.01     Hemoglobin 12.1  12.1  12.0    Hematocrit 40.7  40.4  40.4    MCV 67.5  66.6  67.2    MCH 20.1  19.9  20.0    MCHC 29.7  30.0  29.7    RDW 20.5  20.1  20.3    Platelets 229  227  222      CMP          6/7/2025    06:43 6/8/2025    04:25 6/9/2025    05:42   CMP   Glucose 107  140  150    BUN 29.5  31.6  26.7    Creatinine 0.87  0.97  0.97    EGFR 103.2  93.3  93.3    Sodium 135  135  135    Potassium 3.6  3.8  4.5    Chloride 99  99  101    Calcium 9.1  9.5  9.7    BUN/Creatinine Ratio 33.9  32.6  27.5    Anion Gap 18.1  16.7  14.1       CARDIAC LABS:      Lab 06/03/25  0229 06/03/25  0130   PROBNP  --  3,045.0*   HSTROP T 264* 287*   PROTIME  --  16.6*   INR  --  1.29*        Assessment & Plan   Assessment / Plan     Brief Patient Summary:  Kash Huff is a 55 y.o. male with ACS/NSTEMI and HFrEF. He has severe 2 vessel CAD.     Active Hospital Problems:  Active Hospital Problems    Diagnosis     **Acute hypoxic respiratory failure     Type 1 myocardial infarction     Acute on chronic HFrEF (heart failure with reduced ejection fraction)     Acute systolic congestive heart failure     NSTEMI (non-ST elevated myocardial infarction)            Plan:     He had successful PCI of the LAD with JOEL 3.0 23 mm and 2.5 15 mm overlapped XIENCE Stents and PCI of the RCA with JOEL 4.0 18 mm XIENCE Stent. Good results and post JON 3 dlow. Continue with dual antiplatelet therapy for minimum of 1 year after index procedure. Guideline directed medical therapy. Switch to oral diuretics. Will request LifeVest upon discharge. Possible discharge home tomorrow if stable.        CODE STATUS:   Code Status (Patient has no pulse and is not breathing): CPR (Attempt to Resuscitate)  Medical Interventions (Patient has pulse or is breathing): Full Support  Level Of Support Discussed With: Patient      Electronically signed by Daniel Ybarra MD, 06/09/25, 12:30 PM EDT.

## 2025-06-09 NOTE — PROGRESS NOTES
[facility]   Hospitalist Progress Note  Date: 2025  Patient Name: Kash Huff  : 1970  MRN: [id number]  Date of admission: 6/3/2025  Room/Bed: [facility]      Subjective   Subjective     Chief Complaint: Shortness of breath    Summary:Kash Huff is a 55 y.o. male with hypertension, hyperlipidemia, type 2 diabetes who presents to the ED for evaluation of shortness of breath.  X-ray showed bilateral infiltrates concerning for pulmonary edema and suspected small bilateral pleural effusions.  Cardiology consulted.  Pulmonology consulted.  Patient's breathing is improved with diuresis.  Heart catheterization on .  During procedure patient went into V-fib and required cardioversion.  Cardiology repeated catheterization on .  PCI of the LAD with a drug-eluting stent.  Patient needs dual antiplatelet therapy for at least 1 year.    Interval Followup:     Patient was seen before left heart cath and PCI.  He was feeling well.  He has been diuresing well during this admission.  Hopeful patient will be cleared for discharge by cardiology tomorrow    All systems reviewed and negative except for what is outlined above.      Objective   Objective     Vitals:   Temp:  [97.5 °F (36.4 °C)-98.2 °F (36.8 °C)] 98.1 °F (36.7 °C)  Heart Rate:  [68-81] 73  Resp:  [16-18] 16  BP: ()/(49-71) 107/57    Physical Exam   General: NAD  Cardiovascular: Normal S1, S2  Pulmonary: No accessory muscle use  MSK: No lower extremity swelling  Psych: Mood and affect appropriate    Result Review    Result Review:  I have personally reviewed these results:  [x]  Laboratory      Lab 25  0542 25  1723 25  1022 25  0425 25  1509 25  0643 25  1214 25  0542 25  1614 25  0855 25  0449 25  0211 25  0130   WBC 11.61*  --   --  13.84*  --  16.28*   < > 21.19*  --   --   --   --  15.91*   HEMOGLOBIN 12.0*  --   --  12.1*  --  12.1*   < > 9.9*  --   --    --   --  10.2*   HEMOGLOBIN, POC  --   --   --   --   --   --    < >  --   --   --   --   --   --    HEMATOCRIT 40.4  --   --  40.4  --  40.7   < > 33.1*  --   --   --   --  35.6*   HEMATOCRIT POC  --   --   --   --   --   --    < >  --   --   --   --   --   --    PLATELETS 222  --   --  227  --  229   < > 224  --   --   --   --  224   NEUTROS ABS  --   --   --   --   --  10.99*  --  17.21*  --   --   --   --  11.78*   IMMATURE GRANS (ABS)  --   --   --   --   --  0.07*  --  0.10*  --   --   --   --  0.06*   LYMPHS ABS  --   --   --   --   --  4.04*  --  2.52  --   --   --   --  2.92   MONOS ABS  --   --   --   --   --  1.11*  --  1.33*  --   --   --   --  0.89   EOS ABS  --   --   --   --   --  0.03  --  0.00  --   --   --   --  0.17   MCV 67.2*  --   --  66.6*  --  67.5*   < > 67.6*  --   --   --   --  69.8*   PROCALCITONIN  --   --   --   --   --   --   --   --   --  0.07  --   --   --    LACTATE  --   --   --   --   --   --   --   --   --   --  2.0 2.6* 2.5*   PROTIME  --   --   --   --   --   --   --   --   --   --   --   --  16.6*   APTT 115.2* 90.3 86.0 100.9*   < > 102.3*   < > 75.7*   < > 56.6* 110.9*  --  37.2*    < > = values in this interval not displayed.         Lab 06/09/25  0542 06/08/25  0425 06/07/25  0643 06/06/25  0347 06/05/25  0306 06/04/25  0542 06/03/25  0130   SODIUM 135* 135* 135*   < > 138   < > 138   POTASSIUM 4.5 3.8 3.6   < > 3.3*   < > 3.8   CHLORIDE 101 99 99   < > 102   < > 103   CO2 19.9* 19.3* 17.9*   < > 20.4*   < > 17.4*   ANION GAP 14.1 16.7* 18.1*   < > 15.6*   < > 17.6*   BUN 26.7* 31.6* 29.5*   < > 28.5*   < > 14.5   CREATININE 0.97 0.97 0.87   < > 0.96   < > 0.98   EGFR 93.3 93.3 103.2   < > 94.5   < > 92.2   GLUCOSE 150* 140* 107*   < > 109*   < > 171*   CALCIUM 9.7 9.5 9.1   < > 9.3   < > 9.3   MAGNESIUM 2.5 2.5  --   --  1.9  --   --    HEMOGLOBIN A1C  --   --   --   --   --   --  5.60    < > = values in this interval not displayed.         Lab 06/03/25  0130   TOTAL  PROTEIN 7.5   ALBUMIN 4.5   GLOBULIN 3.0   ALT (SGPT) 14   AST (SGOT) 22   BILIRUBIN 0.7   ALK PHOS 109         Lab 06/03/25  0229 06/03/25  0130   PROBNP  --  3,045.0*   HSTROP T 264* 287*   PROTIME  --  16.6*   INR  --  1.29*         Lab 06/03/25  0229   CHOLESTEROL 123   LDL CHOL 72   HDL CHOL 29*   TRIGLYCERIDES 117             Lab 06/05/25  0808 06/03/25  0211   PH, ARTERIAL 7.494* 7.357   PCO2, ARTERIAL 29.6* 31.3*   PO2 ART 61.2* 107.0*   O2 SATURATION ART 93.4* 98.0   FIO2  --  35   HCO3 ART 22.8 17.5*   BASE EXCESS ART 0.2 -7.0*     Brief Urine Lab Results       None          [x]  Microbiology   Microbiology Results (last 10 days)       Procedure Component Value - Date/Time    COVID-19, FLU A/B, RSV PCR 1 HR TAT - Swab, Nasopharynx [[id number]]  (Normal) Collected: 06/03/25 0253    Lab Status: Final result Specimen: Swab from Nasopharynx Updated: 06/03/25 0341     COVID19 Not Detected     Influenza A PCR Not Detected     Influenza B PCR Not Detected     RSV, PCR Not Detected    Narrative:      Fact sheet for providers: https://www.fda.gov/media/133085/download    Fact sheet for patients: https://www.fda.gov/media/888224/download    Test performed by PCR.    Blood Culture - Blood, Arm, Left [[id number]]  (Normal) Collected: 06/03/25 0131    Lab Status: Final result Specimen: Blood from Arm, Left Updated: 06/08/25 0146     Blood Culture No growth at 5 days    Narrative:      Less than seven (7) mL's of blood was collected.  Insufficient quantity may yield false negative results.    Blood Culture - Blood, Arm, Right [[id number]]  (Normal) Collected: 06/03/25 0130    Lab Status: Final result Specimen: Blood from Arm, Right Updated: 06/08/25 0146     Blood Culture No growth at 5 days    Narrative:      Less than seven (7) mL's of blood was collected.  Insufficient quantity may yield false negative results.          [x]  Radiology  XR Chest 1 View  Result Date: 6/6/2025  Impression: Interval clearing of pulmonary  edema. No active disease. Electronically Signed: Dheeraj Chacon MD  6/6/2025 9:18 AM EDT  Workstation ID: [id number]    CT Angiogram Chest Pulmonary Embolism  Result Date: 6/3/2025  1.  No pulmonary embolism. 2.  No thoracic aortic aneurysm or dissection. 3.  Moderate-to-large bilateral pleural effusions are seen. 4.  There is borderline cardiac enlargement. The left ventricle appears dilated. 5.  There are coronary artery calcifications. 6.  Bilateral nodular groundglass opacities are noted and are nonspecific. Infectious/inflammatory process is possible, as detailed above. 7.  Superimposed mild pulmonary edema is suspected. 8.  Bibasilar pneumonia and/or atelectasis is (are) suggested. 9.  Please see above comments for further detail.   Portions of this note were completed with a voice recognition program.  6/3/2025 4:12 AM by Sudarshan Valderrama MD on Workstation: [email or web address]      XR Chest 1 View  Result Date: 6/3/2025  Bilateral infiltrates are seen. The findings may represent pulmonary edema with vascular congestion. Infectious multifocal pneumonia cannot be excluded. There are suspected small bilateral pleural effusions. Please see above comments for further detail.    Portions of this note were completed with a voice recognition program.  6/3/2025 1:52 AM by Sudarshan Valderrama MD on Workstation: [email or web address]      []  EKG/Telemetry   []  Cardiology/Vascular   []  Pathology  []  Old records  []  Other:    Assessment & Plan        Assessment and Plan:    #Heart failure with reduced ejection fraction  #NSTEMI   #Coronary artery disease  Cardiology following  IV Lasix 40 mg twice daily  Continue heparin infusion  Aspirin  Statin  Metoprolol  Entresto  Brilinta  Aldactone  Jardiance    #Hypertension  #Hyperlipidemia  Medications as above    #Type 2 diabetes mellitus  Hypoglycemia protocol  Sliding scale insulin  POC glucose    #Metabolic acidosis-improving  Sodium bicarb tabs    Brovana and Pulmicort     Medical course will dictate  further management    Discussed with RN.    VTE Prophylaxis:  Mechanical VTE prophylaxis orders are present.        CODE STATUS:   Code Status (Patient has no pulse and is not breathing): CPR (Attempt to Resuscitate)  Medical Interventions (Patient has pulse or is breathing): Full Support  Level Of Support Discussed With: Patient      Electronically signed by Leonidas Ashley MD, 6/9/2025, 13:57 EDT.

## 2025-06-10 ENCOUNTER — TELEPHONE (OUTPATIENT)
Dept: CARDIOLOGY | Facility: CLINIC | Age: 55
End: 2025-06-10

## 2025-06-10 ENCOUNTER — READMISSION MANAGEMENT (OUTPATIENT)
Dept: CALL CENTER | Facility: HOSPITAL | Age: 55
End: 2025-06-10
Payer: OTHER GOVERNMENT

## 2025-06-10 VITALS
RESPIRATION RATE: 16 BRPM | TEMPERATURE: 98.1 F | HEART RATE: 73 BPM | BODY MASS INDEX: 25.16 KG/M2 | WEIGHT: 160.27 LBS | DIASTOLIC BLOOD PRESSURE: 58 MMHG | OXYGEN SATURATION: 100 % | HEIGHT: 67 IN | SYSTOLIC BLOOD PRESSURE: 97 MMHG

## 2025-06-10 LAB
ANION GAP SERPL CALCULATED.3IONS-SCNC: 12.9 MMOL/L (ref 5–15)
ANISOCYTOSIS BLD QL: NORMAL
BASOPHILS # BLD AUTO: 0.03 10*3/MM3 (ref 0–0.2)
BASOPHILS NFR BLD AUTO: 0.2 % (ref 0–1.5)
BUN SERPL-MCNC: 23.8 MG/DL (ref 6–20)
BUN/CREAT SERPL: 25.9 (ref 7–25)
CALCIUM SPEC-SCNC: 9 MG/DL (ref 8.6–10.5)
CHLORIDE SERPL-SCNC: 98 MMOL/L (ref 98–107)
CO2 SERPL-SCNC: 20.1 MMOL/L (ref 22–29)
CREAT SERPL-MCNC: 0.92 MG/DL (ref 0.76–1.27)
DEPRECATED RDW RBC AUTO: 44.7 FL (ref 37–54)
EGFRCR SERPLBLD CKD-EPI 2021: 99.5 ML/MIN/1.73
EOSINOPHIL # BLD AUTO: 0.09 10*3/MM3 (ref 0–0.4)
EOSINOPHIL NFR BLD AUTO: 0.7 % (ref 0.3–6.2)
ERYTHROCYTE [DISTWIDTH] IN BLOOD BY AUTOMATED COUNT: 20 % (ref 12.3–15.4)
GLUCOSE BLDC GLUCOMTR-MCNC: 219 MG/DL (ref 70–99)
GLUCOSE SERPL-MCNC: 144 MG/DL (ref 65–99)
HCT VFR BLD AUTO: 39.2 % (ref 37.5–51)
HGB BLD-MCNC: 11.7 G/DL (ref 13–17.7)
HYPOCHROMIA BLD QL: NORMAL
IMM GRANULOCYTES # BLD AUTO: 0.04 10*3/MM3 (ref 0–0.05)
IMM GRANULOCYTES NFR BLD AUTO: 0.3 % (ref 0–0.5)
LYMPHOCYTES # BLD AUTO: 2.82 10*3/MM3 (ref 0.7–3.1)
LYMPHOCYTES NFR BLD AUTO: 21.4 % (ref 19.6–45.3)
MAGNESIUM SERPL-MCNC: 2.6 MG/DL (ref 1.6–2.6)
MCH RBC QN AUTO: 20 PG (ref 26.6–33)
MCHC RBC AUTO-ENTMCNC: 29.8 G/DL (ref 31.5–35.7)
MCV RBC AUTO: 66.9 FL (ref 79–97)
MICROCYTES BLD QL: NORMAL
MONOCYTES # BLD AUTO: 1.18 10*3/MM3 (ref 0.1–0.9)
MONOCYTES NFR BLD AUTO: 8.9 % (ref 5–12)
NEUTROPHILS NFR BLD AUTO: 68.5 % (ref 42.7–76)
NEUTROPHILS NFR BLD AUTO: 9.03 10*3/MM3 (ref 1.7–7)
NRBC BLD AUTO-RTO: 0 /100 WBC (ref 0–0.2)
PLAT MORPH BLD: NORMAL
PLATELET # BLD AUTO: 211 10*3/MM3 (ref 140–450)
POTASSIUM SERPL-SCNC: 4.2 MMOL/L (ref 3.5–5.2)
QT INTERVAL: 506 MS
QTC INTERVAL: 562 MS
RBC # BLD AUTO: 5.86 10*6/MM3 (ref 4.14–5.8)
SODIUM SERPL-SCNC: 131 MMOL/L (ref 136–145)
WBC MORPH BLD: NORMAL
WBC NRBC COR # BLD AUTO: 13.19 10*3/MM3 (ref 3.4–10.8)

## 2025-06-10 PROCEDURE — 82948 REAGENT STRIP/BLOOD GLUCOSE: CPT

## 2025-06-10 PROCEDURE — 85007 BL SMEAR W/DIFF WBC COUNT: CPT | Performed by: INTERNAL MEDICINE

## 2025-06-10 PROCEDURE — 25010000002 FUROSEMIDE PER 20 MG: Performed by: INTERNAL MEDICINE

## 2025-06-10 PROCEDURE — 83735 ASSAY OF MAGNESIUM: CPT | Performed by: STUDENT IN AN ORGANIZED HEALTH CARE EDUCATION/TRAINING PROGRAM

## 2025-06-10 PROCEDURE — 63710000001 INSULIN LISPRO (HUMAN) PER 5 UNITS: Performed by: INTERNAL MEDICINE

## 2025-06-10 PROCEDURE — 99232 SBSQ HOSP IP/OBS MODERATE 35: CPT | Performed by: INTERNAL MEDICINE

## 2025-06-10 PROCEDURE — 99239 HOSP IP/OBS DSCHRG MGMT >30: CPT | Performed by: INTERNAL MEDICINE

## 2025-06-10 PROCEDURE — 85025 COMPLETE CBC W/AUTO DIFF WBC: CPT | Performed by: INTERNAL MEDICINE

## 2025-06-10 PROCEDURE — 80048 BASIC METABOLIC PNL TOTAL CA: CPT | Performed by: INTERNAL MEDICINE

## 2025-06-10 PROCEDURE — 93005 ELECTROCARDIOGRAM TRACING: CPT | Performed by: INTERNAL MEDICINE

## 2025-06-10 RX ORDER — GLUCOSAM/CHON-MSM1/C/MANG/BOSW 500-416.6
TABLET ORAL
Qty: 100 EACH | Refills: 0 | Status: SHIPPED | OUTPATIENT
Start: 2025-06-10

## 2025-06-10 RX ORDER — BLOOD SUGAR DIAGNOSTIC
STRIP MISCELLANEOUS
Qty: 100 EACH | Refills: 0 | Status: SHIPPED | OUTPATIENT
Start: 2025-06-10

## 2025-06-10 RX ORDER — FUROSEMIDE 20 MG/1
20 TABLET ORAL DAILY
Qty: 30 TABLET | Refills: 0 | Status: SHIPPED | OUTPATIENT
Start: 2025-06-10 | End: 2025-07-10

## 2025-06-10 RX ORDER — SPIRONOLACTONE 25 MG/1
25 TABLET ORAL DAILY
Qty: 30 TABLET | Refills: 0 | Status: SHIPPED | OUTPATIENT
Start: 2025-06-11 | End: 2025-07-11

## 2025-06-10 RX ORDER — ROSUVASTATIN CALCIUM 20 MG/1
20 TABLET, COATED ORAL NIGHTLY
Qty: 30 TABLET | Refills: 0 | Status: SHIPPED | OUTPATIENT
Start: 2025-06-10 | End: 2025-07-10

## 2025-06-10 RX ORDER — TICAGRELOR 90 MG/1
90 TABLET, FILM COATED ORAL 2 TIMES DAILY
Qty: 60 EACH | Refills: 0 | Status: SHIPPED | OUTPATIENT
Start: 2025-06-10 | End: 2025-07-10

## 2025-06-10 RX ORDER — ASPIRIN 81 MG/1
81 TABLET ORAL DAILY
Start: 2025-06-11

## 2025-06-10 RX ORDER — METOPROLOL SUCCINATE 25 MG/1
25 TABLET, EXTENDED RELEASE ORAL
Qty: 30 TABLET | Refills: 0 | Status: SHIPPED | OUTPATIENT
Start: 2025-06-11 | End: 2025-07-11

## 2025-06-10 RX ORDER — BLOOD-GLUCOSE METER
EACH MISCELLANEOUS
Qty: 1 KIT | Refills: 0 | Status: SHIPPED | OUTPATIENT
Start: 2025-06-10

## 2025-06-10 RX ADMIN — SODIUM BICARBONATE 650 MG TABLET 650 MG: at 12:04

## 2025-06-10 RX ADMIN — Medication 10 ML: at 08:36

## 2025-06-10 RX ADMIN — EMPAGLIFLOZIN 10 MG: 10 TABLET, FILM COATED ORAL at 08:35

## 2025-06-10 RX ADMIN — ASPIRIN 81 MG: 81 TABLET, COATED ORAL at 08:35

## 2025-06-10 RX ADMIN — SPIRONOLACTONE 25 MG: 25 TABLET ORAL at 08:35

## 2025-06-10 RX ADMIN — TICAGRELOR 90 MG: 90 TABLET ORAL at 08:34

## 2025-06-10 RX ADMIN — FUROSEMIDE 40 MG: 10 INJECTION, SOLUTION INTRAMUSCULAR; INTRAVENOUS at 08:36

## 2025-06-10 RX ADMIN — SACUBITRIL AND VALSARTAN 1 TABLET: 24; 26 TABLET, FILM COATED ORAL at 08:34

## 2025-06-10 RX ADMIN — SODIUM BICARBONATE 650 MG TABLET 650 MG: at 08:35

## 2025-06-10 RX ADMIN — INSULIN LISPRO 4 UNITS: 100 INJECTION, SOLUTION INTRAVENOUS; SUBCUTANEOUS at 12:03

## 2025-06-10 RX ADMIN — METOPROLOL SUCCINATE ER TABLETS 25 MG: 25 TABLET, FILM COATED, EXTENDED RELEASE ORAL at 08:35

## 2025-06-10 NOTE — DISCHARGE SUMMARY
[facility]         HOSPITALIST  DISCHARGE SUMMARY    Patient Name: Kash Huff  : 1970  MRN: [id number]    Date of Admission: 6/3/2025  Date of Discharge:  6/10/2025    Primary Care Physician: Brock Davison PA    Consults       Date and Time Order Name Status Description    6/3/2025  8:28 AM Inpatient Pulmonology Consult Completed     6/3/2025  8:28 AM Inpatient Cardiology Consult      6/3/2025  4:12 AM Inpatient Cardiology Consult      6/3/2025  2:30 AM Inpatient Hospitalist Consult              Active and Resolved Hospital Problems:  Active Hospital Problems    Diagnosis POA    **Acute hypoxic respiratory failure [J96.01] Yes    Type 1 myocardial infarction [I21.9] Yes    Acute on chronic HFrEF (heart failure with reduced ejection fraction) [I50.23] Yes    Acute systolic congestive heart failure [I50.21] Unknown    NSTEMI (non-ST elevated myocardial infarction) [I21.4] Unknown      Resolved Hospital Problems   No resolved problems to display.       Hospital Course     Hospital Course:  Kash Huff is a 55 y.o. male with hypertension, hyperlipidemia, type 2 diabetes who presented to the ED for evaluation of shortness of breath.  Chest xray showed bilateral infiltrates concerning for pulmonary edema and suspected small bilateral pleural effusions.  Cardiology consulted.  Pulmonology consulted.  Patient's breathing is improved with diuresis.  Patient had a Heart catheterization on  and during the procedure patient went into V-fib and required cardioversion.  Cardiology repeated catheterization on .  PCI of the LAD with a drug-eluting stent.  Patient needs dual antiplatelet therapy for minimum of 1 year.  Patient is to continue with high-dose statin and guideline directed therapy.  Patient also is being discharged with a LifeVest.  Patient will be discharged home today in stable condition with close outpatient follow-up with cardiology.         DISCHARGE Follow  Up Recommendations for labs and diagnostics:   Follow-up with cardiology as directed  Follow-up with primary care in 1 week      Day of Discharge     Vital Signs:  Temp:  [97.7 °F (36.5 °C)-98.1 °F (36.7 °C)] 98.1 °F (36.7 °C)  Heart Rate:  [71-88] 73  Resp:  [16-20] 16  BP: ()/(43-67) 97/58  Physical Exam:               Constitutional: Awake, alert, no acute distress.  Resting in bed with wife at the bedside              Eyes: Pupils equal, sclerae anicteric, no conjunctival injection              HENT: NCAT, mucous membranes moist              Respiratory: Clear to auscultation bilaterally, nonlabored respirations               Cardiovascular: RRR, no murmurs              Gastrointestinal: Positive bowel sounds, soft, nontender, nondistended              Musculoskeletal: Full range of motion              Neurologic: Oriented x 3,  speech clear              Skin: No rashes          Discharge Details        Discharge Medications        New Medications        Instructions Start Date   Alcohol Pads 70 % pads   Apply one alcohol swab to injection site of skin immediately prior to insulin injection. Formulary Compliance Approval.      aspirin 81 MG EC tablet   81 mg, Oral, Daily   Start Date: June 11, 2025     empagliflozin 10 MG tablet tablet  Commonly known as: JARDIANCE   10 mg, Oral, Daily   Start Date: June 11, 2025     furosemide 20 MG tablet  Commonly known as: Lasix   20 mg, Oral, Daily      glucose blood test strip   Use to test blood glucose up to four times daily as needed. Formulary Compliance Approval. Diagnosis: Type 2 Diabetes - Not Insulin Dependent      glucose monitor monitoring kit   Use to test blood glucose up to four times daily as needed. Formulary Compliance Approval. Diagnosis: Type 2 Diabetes - Not Insulin Dependent      Lancets misc   Use to test blood glucose up to four times daily as needed. Formulary Compliance Approval. Diagnosis: Type 2 Diabetes - Not Insulin Dependent       metoprolol succinate XL 25 MG 24 hr tablet  Commonly known as: TOPROL-XL   25 mg, Oral, Every 24 Hours Scheduled   Start Date: June 11, 2025     sacubitril-valsartan 24-26 MG tablet  Commonly known as: ENTRESTO   1 tablet, Oral, Every 12 Hours Scheduled      spironolactone 25 MG tablet  Commonly known as: ALDACTONE   25 mg, Oral, Daily   Start Date: June 11, 2025     ticagrelor 90 MG tablet tablet  Commonly known as: BRILINTA   90 mg, Oral, 2 Times Daily             Changes to Medications        Instructions Start Date   rosuvastatin 20 MG tablet  Commonly known as: CRESTOR  What changed:   medication strength  how much to take  when to take this   20 mg, Oral, Nightly             Continue These Medications        Instructions Start Date   glimepiride 4 MG tablet  Commonly known as: AMARYL   4 mg, 2 Times Daily      lisinopril 10 MG tablet  Commonly known as: PRINIVIL,ZESTRIL   10 mg, Daily      Mounjaro 5 MG/0.5ML solution auto-injector  Generic drug: Tirzepatide   Inject 0.5 mL under the skin into the appropriate area as directed Every 7 (Seven) Days. Monday of each week      omeprazole 40 MG capsule  Commonly known as: priLOSEC   40 mg, Daily             Stop These Medications      amoxicillin 500 MG capsule  Commonly known as: AMOXIL              No Known Allergies    Discharge Disposition:  Home or Self Care    Diet:  Hospital:  Diet Order   Procedures    Diet: Cardiac; Healthy Heart (2-3 Na+); Fluid Consistency: Thin (IDDSI 0)       Discharge Activity: as tolerated       CODE STATUS:  Code Status and Medical Interventions: CPR (Attempt to Resuscitate); Full Support   Ordered at: 06/03/25 0330     Code Status (Patient has no pulse and is not breathing):    CPR (Attempt to Resuscitate)     Medical Interventions (Patient has pulse or is breathing):    Full Support     Level Of Support Discussed With:    Patient         No future appointments.    Additional Instructions for the Follow-ups that You Need to  Schedule       Ambulatory Referral to Cardiac Rehab   As directed      Discharge Follow-up with PCP   As directed       Currently Documented PCP:    Brock Davison PA    PCP Phone Number:    [phone]     Follow Up Details: in 1 week        Discharge Follow-up with Specified Provider: Cardioloy; 1 Week   As directed      To: Cardioloy   Follow Up: 1 Week                Pertinent  and/or Most Recent Results     PROCEDURES:   Results for orders placed during the hospital encounter of 25    Cardiac Catheterization/Vascular Study    Conclusion  [facility]  CARDIAC CATHETERIZATION PERCUTANEOUS CORONARY INTERVENTION PROCEDURE REPORT    Patient: Kash Huff  : 1970  MRN: [id number]  Procedure Date: 25    Referring cardiologist:  Daniel Ybarra MD    Interventional Cardiologist:  Daniel Ybarra MD    Indication:  NSTEMI  HFrEF  Severe 2 vessel CAD    Clinical Presentation:  55 year old man with a NSTEMI, HFrEF. Recent cath showed severe mid RCA stenosis and  proximal LAD. He was diuresed over the weekend on guideline directed medical therapy. Now back in the lab for PCI of the LAD and RCA    Procedure performed:    Successful percutaneous coronary intervention to Left Anterior Descending Coronary Artery with JOEL 3.0 23 mm overlapped with 2.5 15 mm XIENCE Stents, post dilated with 3.25 12 mm NC balloon to 18 keyshawn. Excellent results, stent apposition, no residual stenosis at the stented segment Moderate diffuse distal LAD disease. The very distal with very severe disease and competitive flow from RCA.  2.   Successful percutaneous coronary intervention of the mid Right Coronary Artery with JOEL 4.0 18 mm XIENCE Stent, 0 % residual stenosis, post JON 3 flow, no residual stenosis.    Access Site(s):  Right Radial    Findings:    1. Coronary Artery Anatomy:  Dominance: Right  Left Main: No stenosis.  Left Anterior Descending: Severe, 99% proximal stenosis, sub-total occlusion.  JON 1 flow.  Circumflex Artery: Mild proximal disease. The first an second marginal branches had mild disease.  Right Coronary Artery:  Large and dominant with mid 85-90 % stenosis. The PDA had mild disease. The PL branch appears to be occluded.      1. Percutaneous Intervention:  Location: Proximal-mid LAD  Treatment: JOEL 3.0 23 mm overlapped with 2.5 15 mm XIENCE Stents, 0 % residual stenosis, post JON 3 flow. Very distal severe LAD disease which fills via right collaterals.  Pre-stenosis: 100%  Post-stenosis: 0 %  Lesion Type C: N  JON Flow Pre: 0  JON Flow Post: 3  Bifurcation: N  Severe Calcium: n  Dissection: No    Location: RCA mid  Treatment: JOEL 4.0 18 mm XiENCE Stent  Pre-stenosis: 90 %  Post-stenosis: 0 %  Lesion Type C: N  JON Flow Pre: 3  JON Flow Post: 3  Bifurcation: N  Severe Calcium: N  Dissection: No    Description of the Intervention    The patient has severe two-vessel coronary artery disease with chronic total occlusion of the proximal LAD and severe, 90% stenosis of the mid right coronary artery.  His filling pressures were elevated and he was in heart failure but diuresed over the weekend and now stable.  He is back into the Cath Lab for PCI of both vessels.  Access obtained in the right radial artery with the standard technique and a 6 Bermudian sheath was inserted into the right radial artery and nitroglycerin, heparin 1000 and verapamil 2.5 mg given IA.  First attention was to the LAD.    A 6 Bermudian 3.5 EBU guide catheter was used to engage the left coronary artery.  A run-through wire was able to cross the occluded LAD and placed in the distal segment.  The LAD was then predilated with a 2.0 x 20 mm balloon to 16 keyshawn and the stented from proximal to mid with a drug-eluting 3.0 x 23 mm Xience stent deployed at 12 keyshawn and postdilated with the stent balloon to 18 keyshawn.  There was a significant stenosis at the distal edge which was covered with a lot of stent 2.5 x 15 mm Xience deployed in  overlapped fashion to 16 keyshawn.  The stented segment was then postdilated with a 3.25 x 12 NC balloon to 18 keyshawn with excellent angiographic results and no residual stenosis at the stented segments.  There was post JON-3 flow down the vessel with competitive flow down the very distal LAD which fills via right coronary artery collaterals.  Difficult improvement in septals and diagonal branches which were occluded before.  The patient was in stable condition and after this attention was to the right coronary artery.  A 6 Slovenian JR 3.5 guide catheter was used to engage the right coronary artery and the run-through wire navigated through cross the stenotic segment in the mid area and placed distally.  The stenotic segment was predilated with a 3.0 x 12 mm balloon to 16 keyshawn and stented with a drug-eluting 4 x 18 mm Xience stent deployed at 12 keyshawn and postdilated with the stent balloon to 18 keyshawn with excellent stent apposition and no residual stenosis the central segment.  Post JON-3 flow down the vessel with good collateral flow through the very distal LAD.  Patient was in a stable condition and the wire and the guide catheter removed.  The sheath removed anterior bilaterally for hemostasis and the patient transferred to telemetry floor for further management and observation.          Conclusions:  ACS/ NSTEMI with HFrEF  Successful PCI of the LAD with JOEL and RCA with JOEL.      Recommendations:  Dual antiplatelet therapy with Aspirin 81 mg po daily and Ticagrelor 90 mg po bid for minimum of 1 year after index procedure.  Continue with guideline directed medical therapy.    Procedure Status:  Completed.    Details of the angioplasty:  Informed consent was obtained with an explanation of the risks, benefits and alternatives of the procedure.  The diagnostic angiograms done by Dr. Wilian Ybarra. Were studied in detail and patient was noted to have severe mid RCA stenosis , 90 % and LAD  proximally.      Angiomax given IVP  followed by infusion and f ACT was over 400 seconds.  Patient was already on aspirin. And Ticagrelor.  .  At the end of the procedure, the radial artery sheath was removed and TR band was applied for hemostasis.  Patient tolerated procedure well without any complications.  The results of the test were explained in detail to the patient.    Cumulative fluoroscopy time: 9.9 min    Cumulative air kerma: 412 mGy    Total amount of contrast used: 90 ml of Isovue      Complications:  None.    Estimated Blood Loss:  10 mL    Daniel Ybarra MD, [facility]    06/09/25  12:16 EDT      LAB RESULTS:      Lab 06/10/25  0618 06/09/25  0542 06/08/25  1723 06/08/25  1022 06/08/25  0425 06/07/25  2143 06/07/25  1509 06/07/25  0643 06/06/25  1056 06/06/25  0347 06/04/25  1214 06/04/25  0542   WBC 13.19* 11.61*  --   --  13.84*  --   --  16.28*  --  13.76*   < > 21.19*   HEMOGLOBIN 11.7* 12.0*  --   --  12.1*  --   --  12.1*  --  10.9*   < > 9.9*   HEMOGLOBIN, POC  --   --   --   --   --   --   --   --   --   --    < >  --    HEMATOCRIT 39.2 40.4  --   --  40.4  --   --  40.7  --  37.1*   < > 33.1*   HEMATOCRIT POC  --   --   --   --   --   --   --   --   --   --    < >  --    PLATELETS 211 222  --   --  227  --   --  229  --  222   < > 224   NEUTROS ABS 9.03*  --   --   --   --   --   --  10.99*  --   --   --  17.21*   IMMATURE GRANS (ABS) 0.04  --   --   --   --   --   --  0.07*  --   --   --  0.10*   LYMPHS ABS 2.82  --   --   --   --   --   --  4.04*  --   --   --  2.52   MONOS ABS 1.18*  --   --   --   --   --   --  1.11*  --   --   --  1.33*   EOS ABS 0.09  --   --   --   --   --   --  0.03  --   --   --  0.00   MCV 66.9* 67.2*  --   --  66.6*  --   --  67.5*  --  67.6*   < > 67.6*   APTT  --  115.2* 90.3 86.0 100.9* 96.8*   < > 102.3*   < > 101.7*   < > 75.7*    < > = values in this interval not displayed.         Lab 06/10/25  0619 06/09/25  0542 06/08/25  0425 06/07/25  0643 06/06/25  0347 06/05/25  0306   SODIUM 131* 135* 135*  135* 138 138   POTASSIUM 4.2 4.5 3.8 3.6 4.0 3.3*   CHLORIDE 98 101 99 99 105 102   CO2 20.1* 19.9* 19.3* 17.9* 17.2* 20.4*   ANION GAP 12.9 14.1 16.7* 18.1* 15.8* 15.6*   BUN 23.8* 26.7* 31.6* 29.5* 23.0* 28.5*   CREATININE 0.92 0.97 0.97 0.87 0.86 0.96   EGFR 99.5 93.3 93.3 103.2 103.5 94.5   GLUCOSE 144* 150* 140* 107* 113* 109*   CALCIUM 9.0 9.7 9.5 9.1 9.6 9.3   MAGNESIUM 2.6 2.5 2.5  --   --  1.9                         Lab 06/05/25  0808   PH, ARTERIAL 7.494*   PCO2, ARTERIAL 29.6*   PO2 ART 61.2*   O2 SATURATION ART 93.4*   HCO3 ART 22.8   BASE EXCESS ART 0.2     Brief Urine Lab Results       None          Microbiology Results (last 10 days)       Procedure Component Value - Date/Time    COVID-19, FLU A/B, RSV PCR 1 HR TAT - Swab, Nasopharynx [[id number]]  (Normal) Collected: 06/03/25 0253    Lab Status: Final result Specimen: Swab from Nasopharynx Updated: 06/03/25 0341     COVID19 Not Detected     Influenza A PCR Not Detected     Influenza B PCR Not Detected     RSV, PCR Not Detected    Narrative:      Fact sheet for providers: https://www.fda.gov/media/303769/download    Fact sheet for patients: https://www.fda.gov/media/123401/download    Test performed by PCR.    Blood Culture - Blood, Arm, Left [[id number]]  (Normal) Collected: 06/03/25 0131    Lab Status: Final result Specimen: Blood from Arm, Left Updated: 06/08/25 0146     Blood Culture No growth at 5 days    Narrative:      Less than seven (7) mL's of blood was collected.  Insufficient quantity may yield false negative results.    Blood Culture - Blood, Arm, Right [[id number]]  (Normal) Collected: 06/03/25 0130    Lab Status: Final result Specimen: Blood from Arm, Right Updated: 06/08/25 0146     Blood Culture No growth at 5 days    Narrative:      Less than seven (7) mL's of blood was collected.  Insufficient quantity may yield false negative results.            XR Chest 1 View  Result Date: 6/6/2025  Impression: Interval clearing of pulmonary edema.  No active disease. Electronically Signed: Dheeraj Chacon MD  6/6/2025 9:18 AM EDT  Workstation ID: [id number]    CT Angiogram Chest Pulmonary Embolism  Result Date: 6/3/2025  1.  No pulmonary embolism. 2.  No thoracic aortic aneurysm or dissection. 3.  Moderate-to-large bilateral pleural effusions are seen. 4.  There is borderline cardiac enlargement. The left ventricle appears dilated. 5.  There are coronary artery calcifications. 6.  Bilateral nodular groundglass opacities are noted and are nonspecific. Infectious/inflammatory process is possible, as detailed above. 7.  Superimposed mild pulmonary edema is suspected. 8.  Bibasilar pneumonia and/or atelectasis is (are) suggested. 9.  Please see above comments for further detail.   Portions of this note were completed with a voice recognition program.  6/3/2025 4:12 AM by Sudarshan Valderrama MD on Workstation: [employer]      XR Chest 1 View  Result Date: 6/3/2025  Bilateral infiltrates are seen. The findings may represent pulmonary edema with vascular congestion. Infectious multifocal pneumonia cannot be excluded. There are suspected small bilateral pleural effusions. Please see above comments for further detail.    Portions of this note were completed with a voice recognition program.  6/3/2025 1:52 AM by Sudarshan Valderrama MD on Workstation: [employer]                 Results for orders placed during the hospital encounter of 06/03/25    Adult Transthoracic Echo Complete W/ Cont if Necessary Per Protocol    Interpretation Summary    Left ventricular systolic function is moderately decreased. Calculated left ventricular EF = 25%    The left ventricular cavity is mildly dilated.    The following left ventricular wall segments are hypokinetic: mid anterior, apical lateral, mid inferolateral and mid anteroseptal. The following left ventricular wall segments are akinetic: apical anterior, apical inferior, mid inferior, apical septal, mid inferoseptal and apex.    Left ventricular  diastolic dysfunction is noted.    The left atrial cavity is moderately dilated.    Estimated right ventricular systolic pressure from tricuspid regurgitation is normal (<35 mmHg).      Labs Pending at Discharge:        Time spent on Discharge including face to face service:  more than 35  minutes    Electronically signed by Dex Coleman DO, 06/10/25, 1:15 PM EDT.

## 2025-06-10 NOTE — PLAN OF CARE
Goal Outcome Evaluation:  Plan of Care Reviewed With: patient        Progress: improving  Outcome Evaluation: Doing well S/P heart cath with stents placed. B/P soft around 2AM, improved after drinking some fluids.

## 2025-06-10 NOTE — CASE MANAGEMENT/SOCIAL WORK
Heart Failure Education Consult  Kash Huff  :  1970  DOS:  06/10/25      Primary Cardiologist: N/A New HF pt will be seeing Dr. Ybarra  30 Day Readmit: No    Heart Failure Education    []  Risk factors  [x]  Medications management and adherence  []  Low sodium diet  []  Fluid restriction:   [x]  Exercise/activity/cardiac rehab  []  Smoking cessation  [x]  Signs/symptoms  []  Daily weight management  [x]  Importance of keeping follow up office visits  [x]  Lifevest    PLAN:  Heart failure disease process, management, and above education provided with patient and wife. Educated pt if there are any concerns/symptoms to call the cardiologist office before stopping any medications. Pt discharging today with a Lifevest and it was on the pt when I meet with him and his wife in the room. Patient provided further handouts on HF Zones, EPIC handout on: Heart Failure, Heart Failure:What to Know, and Daily Weight Record. All questions answered, patient and wife verbalized understanding.   Referral candidate for HF Clinic:No   Referral candidate for RPM:No  Please let me know if I can be of any further assistance with HF education for this patient.    Electronically signed by,   SANTOS Thompson, RN  06/10/25 14:21 EDT

## 2025-06-10 NOTE — PLAN OF CARE
Goal Outcome Evaluation:  Plan of Care Reviewed With: patient, spouse           Outcome Evaluation: Pt medically cleared by MD to discharge home.

## 2025-06-10 NOTE — DISCHARGE INSTR - APPOINTMENTS
Patient will follow up with cardiology on July 9th @ 2pm ,The office will call if they have anything sooner.  ([phone])   PCP will reach out to schedule a follow up appointment ([phone])

## 2025-06-10 NOTE — PROGRESS NOTES
[facility]     Cardiology Progress Note    Patient Name: Kash Huff  : 1970  MRN: [id number]  Primary Care Physician:  Brock Davison PA  Date of admission: 6/3/2025    Subjective   Subjective     Chief Complaint: The patient is cardiac wise stable.     Interval HPI:    Patient No events overnight.     Review of Systems   All systems were reviewed and negative except for: Sore at the access site.     Objective   Objective     Vitals:   Temp:  [97.7 °F (36.5 °C)-98.1 °F (36.7 °C)] 97.7 °F (36.5 °C)  Heart Rate:  [71-88] 77  Resp:  [16-20] 18  BP: ()/(43-67) 92/43  Physical Exam      Alert  Heart. Regular, no gallops, no rubs  Lungs: no rales, no wheezing  LE: no edema  Right wrist: palpable radial pulse, ecchymosis.  Neurologic. No motor deficits.     Scheduled Meds:arformoterol, 15 mcg, Nebulization, BID - RT  aspirin, 81 mg, Oral, Daily  budesonide, 0.5 mg, Nebulization, BID - RT  empagliflozin, 10 mg, Oral, Daily  furosemide, 40 mg, Intravenous, BID Diuretics  insulin lispro, 2-9 Units, Subcutaneous, 4x Daily AC & at Bedtime  metoprolol succinate XL, 25 mg, Oral, Q24H  rosuvastatin, 20 mg, Oral, Nightly  sacubitril-valsartan, 1 tablet, Oral, Q12H  sodium bicarbonate, 650 mg, Oral, 4x Daily  sodium chloride, 10 mL, Intravenous, Q12H  spironolactone, 25 mg, Oral, Daily  ticagrelor, 90 mg, Oral, BID      Continuous Infusions:        Result Review    Result Review:  I have personally reviewed the results from the time of this admission to 6/10/2025 08:59 EDT and agree with these findings:  [x]  Laboratory  []  Microbiology  [x]  Radiology  [x]  EKG/Telemetry   [x]  Cardiology/Vascular   []  Pathology  []  Old records  []  Other:  Most notable findings include:     CBC          2025    04:25 2025    05:42 6/10/2025    06:18   CBC   WBC 13.84  11.61  13.19    RBC 6.07  6.01  5.86    Hemoglobin 12.1  12.0  11.7    Hematocrit 40.4  40.4  39.2    MCV 66.6  67.2  66.9    MCH 19.9   20.0  20.0    MCHC 30.0  29.7  29.8    RDW 20.1  20.3  20.0    Platelets 227  222  211      CMP          6/8/2025    04:25 6/9/2025    05:42 6/10/2025    06:19   CMP   Glucose 140  150  144    BUN 31.6  26.7  23.8    Creatinine 0.97  0.97  0.92    EGFR 93.3  93.3  99.5    Sodium 135  135  131    Potassium 3.8  4.5  4.2    Chloride 99  101  98    Calcium 9.5  9.7  9.0    BUN/Creatinine Ratio 32.6  27.5  25.9    Anion Gap 16.7  14.1  12.9       CARDIAC LABS:         Assessment & Plan   Assessment / Plan     Brief Patient Summary:  Kash Huff is a 55 y.o. male with ischemic Cardiomyopathy and HFrEF. He had PCI of the LAD and RCA with Drug eluting stents yesterday. He is cardiac wise stable.    Active Hospital Problems:  Active Hospital Problems    Diagnosis     **Acute hypoxic respiratory failure     Type 1 myocardial infarction     Acute on chronic HFrEF (heart failure with reduced ejection fraction)     Acute systolic congestive heart failure     NSTEMI (non-ST elevated myocardial infarction)            Plan:     I would continue with dual antiplatelet therapy for minimum of 1 year after index procedure. Continue with high dose statins and guideline directed medical therapy as tolerated. LifeVest upon discharge. No objection for discharge home today.        CODE STATUS:   Code Status (Patient has no pulse and is not breathing): CPR (Attempt to Resuscitate)  Medical Interventions (Patient has pulse or is breathing): Full Support  Level Of Support Discussed With: Patient      Electronically signed by Daniel Ybarra MD, 06/10/25, 8:59 AM EDT.

## 2025-06-10 NOTE — SIGNIFICANT NOTE
06/10/25 1433   Plan   Plan Pt will discharge home today. Life Vest has been approved, Fitter from Life [employer]est has seen pt and vest has been Fit for Pt. Pt will discharge on Entresto, Jardiance and Brilinta with $0 copay for these 3 medications.   Final Discharge Disposition Code 01 - home or self-care   Final Note Pt will discharge home today.

## 2025-06-10 NOTE — PROGRESS NOTES
Respiratory Therapist Broncho-Pulmonary Hygiene Progress Note      Patient Name:  Kash Huff  YOB: 1970    Kash Huff meets the qualification for Level 1 of the Bronco-Pulmonary Hygiene Protocol. This was based on my daily patient assessment and includes review of chest x-ray results, cough ability and quality, oxygenation, secretions or risk for secretion development and patient mobility.     Broncho-Pulmonary Hygiene Assessment:    Level of Movement: Actively changing positions without assistance  Alert/ oriented/ cooperative    Breath Sounds: Clear to slightly diminished    Cough: Strong, effective    Chest X-Ray: Possible signs of consolidation and/or atelectasis or clear.     Sputum Productions: None or small amount of thin or watery secretions with effective cough    History and Physical: None    SpO2 to Oxygen Need: greater than 92% on room air or  less than 3L nasal canula    Current SpO2 is: 95% on RA    Based on this information, I have completed the following interventions: Teach/Instruct patient on cough and deep breathe      Electronically signed by Karen Marks RRT, 06/10/25, 7:50 AM EDT.

## 2025-06-10 NOTE — TELEPHONE ENCOUNTER
Caller: [facility]    Relationship to patient:     Best call back number: [phone]    New or established patient?  [x] New  [] Established    Date of discharge: 6.10.25    Facility discharged from: [facility]     Diagnosis/Symptoms: ACUTE HYPOXIC RESPIRATORY FAILURE     Length of stay (If applicable): 7 DAYS     Specialty Only: Did you see a [facility] provider?    [x] Yes  [] No  If so, who?     MAYTE WILHELM RN AND HERLINDA WALKER RN - CARDIAC REHAB  DR. GERMAIN - CARDIO  DR. DHILLON - PULMONOLOGY        Additional Details: [facility] CALLED IN TO SCHEDULE A HFU. MUST BE WITHIN ONE WEEK WITH  OR JIIM LOWE. NO ALBER WITHIN TIMEFRAME, SO PT SCHEDULED FOR NEXT ALBER ON 7.9.25 WITH JIMI LOWE.     TO SCHEDULE WITHIN TIMEFRAME, PLS CALL PT AT ABOVE NUMBER - THANKS

## 2025-06-10 NOTE — TELEPHONE ENCOUNTER
Caller: [facility]    Relationship to patient: Provider    Best call back number:   Telephone Information:   Mobile [phone]        New or established patient?  [] New  [x] Established    Date of discharge:6-10-25    Facility discharged from: [facility]    Diagnosis/Symptoms: NSTEMI    Specialty Only: Did you see a [facility] provider?    [x] Yes  [] No  If so, who? DR. ADI GERMAIN    Additional Details: 1 WEEK TIMEFRAME NO AVAILABILITY

## 2025-06-11 NOTE — OUTREACH NOTE
Prep Survey      Flowsheet Row Responses   [facility] facility patient discharged from? [facility]   Is LACE score < 7 ? No   Eligibility Readm Mgmt   Discharge diagnosis Acute hypoxic respiratory failure, NSTEMI, heart cath with PCI   Does the patient have one of the following disease processes/diagnoses(primary or secondary)? Acute MI (STEMI,NSTEMI)   Does the patient have Home health ordered? No   Is there a DME ordered? Yes   What DME was ordered? Life Vest   Prep survey completed? Yes            Steff LLAMAS - Registered Nurse

## 2025-06-17 LAB
QT INTERVAL: 418 MS
QTC INTERVAL: 462 MS

## 2025-06-18 ENCOUNTER — READMISSION MANAGEMENT (OUTPATIENT)
Dept: CALL CENTER | Facility: HOSPITAL | Age: 55
End: 2025-06-18
Payer: OTHER GOVERNMENT

## 2025-06-18 NOTE — OUTREACH NOTE
AMI Week 1 Survey      Flowsheet Row Responses   [facility] facility patient discharged from? [facility]   Does the patient have one of the following disease processes/diagnoses(primary or secondary)? Acute MI (STEMI,NSTEMI)   Week 1 attempt successful? No   Unsuccessful attempts Attempt 1            Cinthia Anaya Registered Nurse

## 2025-06-19 ENCOUNTER — TELEPHONE (OUTPATIENT)
Dept: CARDIOLOGY | Facility: CLINIC | Age: 55
End: 2025-06-19
Payer: OTHER GOVERNMENT

## 2025-06-19 LAB
QT INTERVAL: 506 MS
QTC INTERVAL: 562 MS

## 2025-06-19 NOTE — TELEPHONE ENCOUNTER
Caller: Kash Huff    Relationship: Self    Best call back number: [phone]     What form or medical record are you requesting: SHORT TERM DISABILITY AND FMLA    Who is requesting this form or medical record from you: WORK    How would you like to receive the form or medical records (pick-up, mail, fax):   If fax, what is the fax number:   If mail, what is the address:   If pick-up, provide patient with address and location details    Timeframe paperwork needed: ASAP    Additional notes: PATIENT DROPPED OFF STD & FMLA PAPERWORK LAST WEEK TO HAVE THE PROVIDER SIGN SO IT CAN GET TURNED BACK INTO WORK SINCE PATIENT CAN'T RETURN UNTIL UPCOMING APPOINTMENT. PATIENT'S WIFE CALLING IN TO CHECK THE STATUS OF THE PAPERWORK AS IT NEEDS TO BE TURNED IN ASAP. PLEASE CALL PATIENT TO ADVISE. THANK YOU!

## 2025-06-20 NOTE — TELEPHONE ENCOUNTER
Paperwork was not dropped off to me I will send a message to Lupe to see if she has it. She is on vacation whenever I hear back I will inform pt.

## 2025-06-26 ENCOUNTER — TELEPHONE (OUTPATIENT)
Dept: CARDIOLOGY | Facility: CLINIC | Age: 55
End: 2025-06-26
Payer: OTHER GOVERNMENT

## 2025-06-26 NOTE — TELEPHONE ENCOUNTER
Caller: Kash Huff    Relationship: Self    Best call back number: [phone]    Who are you requesting to speak with (clinical staff, provider,  specific staff member): DR ADI GERMAIN'S ASSISTANT     What was the call regarding: ASKING FOR A CALL BACK TO DISCUSS THE SHORT TERM DISABILITY PAPERWORK.

## 2025-06-26 NOTE — TELEPHONE ENCOUNTER
SPOKE WITH WIFE  POC @ HIS WORK:  JEFFREY TATE  [phone]  LEFT JEFFREY DETAILED MESSAGE FOR HER TO RETURN CALL.

## 2025-06-27 ENCOUNTER — READMISSION MANAGEMENT (OUTPATIENT)
Dept: CALL CENTER | Facility: HOSPITAL | Age: 55
End: 2025-06-27
Payer: OTHER GOVERNMENT

## 2025-06-27 NOTE — OUTREACH NOTE
AMI Week 3 Survey      Flowsheet Row Responses   [facility] facility patient discharged from? [facility]   Does the patient have one of the following disease processes/diagnoses(primary or secondary)? Acute MI (STEMI,NSTEMI)   Week 3 attempt successful? No   Unsuccessful attempts Attempt 1            Nilam LOPEZ - Licensed Nurse

## 2025-07-02 ENCOUNTER — READMISSION MANAGEMENT (OUTPATIENT)
Dept: CALL CENTER | Facility: HOSPITAL | Age: 55
End: 2025-07-02
Payer: COMMERCIAL

## 2025-07-02 NOTE — OUTREACH NOTE
AMI Week 3 Survey      Flowsheet Row Responses   [facility] patient discharged from? [facility]   Does the patient have one of the following disease processes/diagnoses(primary or secondary)? Acute MI (STEMI,NSTEMI)   Week 3 attempt successful? Yes   Call start time 1018   Call end time 1021   Discharge diagnosis Acute hypoxic respiratory failure, NSTEMI, heart cath with PCI   Person spoke with today (if not patient) and relationship Carolyn (spouse)   Meds reviewed with patient/caregiver? Yes   Is the patient having any side effects they believe may be caused by any medication additions or changes? No   Does the patient have all prescriptions related to this admission filled (includes statins,anticoagulants,HTN meds,anti-arrhythmia meds) Yes   Is the patient taking all medications as directed (includes completed medication regime)? Yes   Comments regarding appointments Cardiology appt 7/3   Does the patient have a primary care provider?  Yes   Comments regarding PCP Spouse states patient has followed up with PCP   What DME was ordered? Life Vest   Did the patient receive a copy of their discharge instructions? Yes   Nursing interventions Reviewed instructions with patient   What is the patient's perception of their health status since discharge? Improving   Nursing interventions Nurse provided patient education   Is the patient/caregiver able to teach back lifestyle changes to help prevent MIs Quit smoking   Is the patient/caregiver able to teach back ways to prevent a second heart attack: Take medications, Follow up with MD   If the patient is a current smoker, are they able to teach back resources for cessation? --  [Spouse states patient has smoking cessation information and patient does not want to quit]   Is the patient/caregiver able to teach back the hierarchy of who to call/visit for symptoms/problems? PCP, Specialist, Home health nurse, Urgent Care, ED, 911 Yes   Week 3 call completed? Yes   Graduated  Yes   Is the patient interested in additional calls from an ambulatory ? No   Would this patient benefit from a Referral to Amb Social Work? No   Wrap up additional comments Brief call with spouse. Spouse states patient is doing well. No questions or concerns at this time. Reviewed upcoming appt with cardiology 7/3.   Call end time 1021            TRINITY UPTON - Registered Nurse

## 2025-07-03 ENCOUNTER — TELEPHONE (OUTPATIENT)
Dept: CARDIOLOGY | Facility: CLINIC | Age: 55
End: 2025-07-03
Payer: COMMERCIAL

## 2025-07-03 ENCOUNTER — OFFICE VISIT (OUTPATIENT)
Dept: CARDIOLOGY | Facility: CLINIC | Age: 55
End: 2025-07-03
Payer: COMMERCIAL

## 2025-07-03 VITALS
HEIGHT: 67 IN | WEIGHT: 169 LBS | BODY MASS INDEX: 26.53 KG/M2 | SYSTOLIC BLOOD PRESSURE: 116 MMHG | HEART RATE: 88 BPM | DIASTOLIC BLOOD PRESSURE: 70 MMHG

## 2025-07-03 DIAGNOSIS — I50.20 HFREF (HEART FAILURE WITH REDUCED EJECTION FRACTION): Primary | ICD-10-CM

## 2025-07-03 DIAGNOSIS — I25.10 CORONARY ARTERY DISEASE INVOLVING NATIVE CORONARY ARTERY OF NATIVE HEART WITHOUT ANGINA PECTORIS: ICD-10-CM

## 2025-07-03 DIAGNOSIS — Z86.79 HISTORY OF VENTRICULAR FIBRILLATION: ICD-10-CM

## 2025-07-03 DIAGNOSIS — Z95.5 S/P CORONARY ARTERY STENT PLACEMENT: Primary | ICD-10-CM

## 2025-07-03 DIAGNOSIS — Z95.5 S/P CORONARY ARTERY STENT PLACEMENT: ICD-10-CM

## 2025-07-03 RX ORDER — FUROSEMIDE 20 MG/1
20 TABLET ORAL DAILY
Qty: 90 TABLET | Refills: 3 | Status: SHIPPED | OUTPATIENT
Start: 2025-07-03

## 2025-07-03 RX ORDER — METOPROLOL SUCCINATE 25 MG/1
25 TABLET, EXTENDED RELEASE ORAL
Qty: 90 TABLET | Refills: 3 | Status: SHIPPED | OUTPATIENT
Start: 2025-07-03

## 2025-07-03 RX ORDER — ROSUVASTATIN CALCIUM 20 MG/1
20 TABLET, COATED ORAL NIGHTLY
Qty: 180 TABLET | Refills: 3 | Status: SHIPPED | OUTPATIENT
Start: 2025-07-03

## 2025-07-03 RX ORDER — TICAGRELOR 90 MG/1
90 TABLET, FILM COATED ORAL 2 TIMES DAILY
Qty: 180 EACH | Refills: 3 | Status: SHIPPED | OUTPATIENT
Start: 2025-07-03

## 2025-07-03 RX ORDER — SPIRONOLACTONE 25 MG/1
25 TABLET ORAL DAILY
Qty: 90 TABLET | Refills: 3 | Status: SHIPPED | OUTPATIENT
Start: 2025-07-03

## 2025-07-03 NOTE — PROGRESS NOTES
Chief Complaint  Follow-up, Shortness of Breath, and Fatigue    Subjective        History of Present Illness  Kash Huff presents to [facility]   Mr. Huff is a 55-year-old male coming in today for hospital follow-up.  Medical history significant for hypertension, type 2 diabetes mellitus, and hyperlipidemia.  He initially presented to the ER complaining of shortness of breath,.  He was found to have pulmonary edema and bilateral small pleural effusions.  He was diuresed and done well with this.  Heart catheterization was complicated by episode of V-fib, requiring cardioversion, and subsequently underwent repeat catheterization and required PCI with JOEL to the LAD.  He was discharged home on DAPT therapy and LifeVest.  He had reduced EF of 25%.  He has not had any complaints of chest pain or pressure, and has no complaints of palpitations.  He has not received a defibrillating shocks from LifeVest.  However he has shortness of breath and fatigue, becomes winded very easily.  No swelling or fluid retention.      Past Medical History:   Diagnosis Date    Heart burn        No Known Allergies     Past Surgical History:   Procedure Laterality Date    CARDIAC CATHETERIZATION N/A 6/5/2025    Procedure: Right and Left Heart Cath/possible percutaneous coronary intervention;  Surgeon: Daniel Montoya MD;  Location: [facility] CATH INVASIVE LOCATION;  Service: Cardiovascular;  Laterality: N/A;    CARDIAC CATHETERIZATION N/A 6/9/2025    Procedure: Percutaneous Coronary Intervention;  Surgeon: Daniel Montoya MD;  Location: [facility] CATH INVASIVE LOCATION;  Service: Cardiovascular;  Laterality: N/A;    COLONOSCOPY N/A 8/19/2022    Procedure: COLONOSCOPY with biopsy;  Surgeon: Dheeraj Camacho MD;  Location: [facility] ENDOSCOPY;  Service: General;  Laterality: N/A;  colon polyp    FINGER SURGERY          Social History  He  reports that he has been smoking cigarettes. He has never used smokeless  "tobacco. He reports current alcohol use of about 3.0 standard drinks of alcohol per week. He reports that he does not use drugs.    Family History  His family history is not on file.       Current Outpatient Medications on File Prior to Visit   Medication Sig    Alcohol Swabs (Alcohol Pads) 70 % pads Apply one alcohol swab to injection site of skin immediately prior to insulin injection. Formulary Compliance Approval.    aspirin 81 MG EC tablet Take 1 tablet by mouth Daily.    Blood Glucose Monitoring Suppl (Contour Next EZ) w/Device kit use as directed to check glood sugar 4 times a day    glimepiride (AMARYL) 4 MG tablet Take 1 tablet by mouth 2 (Two) Times a Day.    glucose blood test strip Use to test blood glucose up to four times daily as needed. Formulary Compliance Approval. Diagnosis: Type 2 Diabetes - Not Insulin Dependent    omeprazole (priLOSEC) 40 MG capsule Take 1 capsule by mouth Daily.    Tirzepatide (Mounjaro) 5 MG/0.5ML solution auto-injector Inject 0.5 mL under the skin into the appropriate area as directed Every 7 (Seven) Days. Monday of each week    TRUEplus Lancets 30G misc Use to test blood glucose up to four times daily as needed. Formulary Compliance Approval. Diagnosis: Type 2 Diabetes - Not Insulin Dependent     No current facility-administered medications on file prior to visit.         Review of Systems   See HPI      Objective   Vitals:    07/03/25 0845   BP: 116/70   Pulse: 88   Weight: 76.7 kg (169 lb)   Height: 170.2 cm (67\")         Physical Exam  General : Alert, awake, no acute distress  Neck : Supple, no carotid bruit, no jugular venous distention  CVS : Regular rate and rhythm, no murmur, no rubs or gallops  Lungs: Clear to auscultation bilaterally, no crackles or rhonchi  Abdomen: Soft, nontender, bowel sounds active  Extremities: Warm, well-perfused, no pedal edema      Result Review     The following data was reviewed by GEETHA Mohan  proBNP   Date Value Ref Range " "Status   06/03/2025 3,045.0 (H) 0.0 - 900.0 pg/mL Final     CMP          6/8/2025    04:25 6/9/2025    05:42 6/10/2025    06:19   CMP   Glucose 140  150  144    BUN 31.6  26.7  23.8    Creatinine 0.97  0.97  0.92    EGFR 93.3  93.3  99.5    Sodium 135  135  131    Potassium 3.8  4.5  4.2    Chloride 99  101  98    Calcium 9.5  9.7  9.0    BUN/Creatinine Ratio 32.6  27.5  25.9    Anion Gap 16.7  14.1  12.9      CBC w/diff          6/8/2025    04:25 6/9/2025    05:42 6/10/2025    06:18   CBC w/Diff   WBC 13.84  11.61  13.19    RBC 6.07  6.01  5.86    Hemoglobin 12.1  12.0  11.7    Hematocrit 40.4  40.4  39.2    MCV 66.6  67.2  66.9    MCH 19.9  20.0  20.0    MCHC 30.0  29.7  29.8    RDW 20.1  20.3  20.0    Platelets 227  222  211    Neutrophil Rel %   68.5    Immature Granulocyte Rel %   0.3    Lymphocyte Rel %   21.4    Monocyte Rel %   8.9    Eosinophil Rel %   0.7    Basophil Rel %   0.2       No results found for: \"TSH\"   No results found for: \"FREET4\"   No results found for: \"DDIMERQUANT\"  Magnesium   Date Value Ref Range Status   06/10/2025 2.6 1.6 - 2.6 mg/dL Final      No results found for: \"DIGOXIN\"   Lab Results   Component Value Date    TROPONINT 264 (C) 06/03/2025           Lipid Panel          6/3/2025    02:29   Lipid Panel   Total Cholesterol 123    Triglycerides 117    HDL Cholesterol 29    VLDL Cholesterol 22    LDL Cholesterol  72    LDL/HDL Ratio 2.43          Results for orders placed during the hospital encounter of 06/03/25    Adult Transthoracic Echo Complete W/ Cont if Necessary Per Protocol 06/03/2025  7:25 PM    Interpretation Summary    Left ventricular systolic function is moderately decreased. Calculated left ventricular EF = 25%    The left ventricular cavity is mildly dilated.    The following left ventricular wall segments are hypokinetic: mid anterior, apical lateral, mid inferolateral and mid anteroseptal. The following left ventricular wall segments are akinetic: apical anterior, " apical inferior, mid inferior, apical septal, mid inferoseptal and apex.    Left ventricular diastolic dysfunction is noted.    The left atrial cavity is moderately dilated.    Estimated right ventricular systolic pressure from tricuspid regurgitation is normal (<35 mmHg).             Assessment and Plan   Diagnoses and all orders for this visit:    1. HFrEF (heart failure with reduced ejection fraction) (Primary)  -     Adult Transthoracic Echo Complete W/ Cont if Necessary Per Protocol; Future  -     Comprehensive Metabolic Panel; Future    2. History of ventricular fibrillation    3. Coronary artery disease involving native coronary artery of native heart without angina pectoris  -     CBC (No Diff); Future  -     Comprehensive Metabolic Panel; Future  -     Lipid Panel; Future    4. S/P coronary artery stent placement    Other orders  (maintenance refills, no medication adjustment)  -     empagliflozin (JARDIANCE) 10 MG tablet tablet; Take 1 tablet by mouth Daily.  Dispense: 90 tablet; Refill: 3  -     furosemide (Lasix) 20 MG tablet; Take 1 tablet by mouth Daily.  Dispense: 90 tablet; Refill: 3  -     metoprolol succinate XL (TOPROL-XL) 25 MG 24 hr tablet; Take 1 tablet by mouth Daily.  Dispense: 90 tablet; Refill: 3  -     rosuvastatin (CRESTOR) 20 MG tablet; Take 1 tablet by mouth Every Night.  Dispense: 180 tablet; Refill: 3  -     sacubitril-valsartan (ENTRESTO) 24-26 MG tablet; Take 1 tablet by mouth Every 12 (Twelve) Hours.  Dispense: 180 tablet; Refill: 3  -     spironolactone (ALDACTONE) 25 MG tablet; Take 1 tablet by mouth Daily.  Dispense: 90 tablet; Refill: 3  -     ticagrelor (BRILINTA) 90 MG tablet tablet; Take 1 tablet by mouth 2 (Two) Times a Day.  Dispense: 180 each; Refill: 3    CAD stable without angina, he will need to continue DAPT therapy with Brilinta and aspirin.  Will refer to cardiac rehab HFrEF- he appears to be euvolemic, he has anticipated dyspnea with activity considering his  reduced cardiac function, he is inquiring about going back to work, however unfortunately he does exertional physical activity for work, I would recommend being off work at least until repeat evaluation of LV function with echocardiogram in 3 months.  He should continue GDMT with  Jardiance, furosemide, metoprolol, Entresto, and spironolactone.   Continue LifeVest, will for repeat echocardiogram at 3-month janae to reevaluate LV function.  He is still persistently low, we can discuss options for ICD implant.  Advised to seek emergency attention if he had any defibrillating shocks.        Follow Up   Return for with Dr Ybarra or Geetha Philip in 6-8 weeks.    Patient was given instructions and counseling regarding his condition or for health maintenance advice. Please see specific information pulled into the AVS if appropriate.     Signed,  GEETHA Mohan  07/03/2025     Dictated Utilizing Dragon Dictation: Please note that portions of this note were completed with a voice recognition program.  Part of this note may be an electronic transcription/translation of spoken language to printed text using the Dragon Dictation System.

## 2025-07-03 NOTE — TELEPHONE ENCOUNTER
I have faxed the letter and [facility] paperwork.  I also sent everything to the patient via [employer].

## 2025-07-03 NOTE — TELEPHONE ENCOUNTER
Patient wife called asking if we could write a letter for patient stating what amount of weight the patient can lift- his job is trying to create a job for him so he doesn't lose his insurance.     Please advise

## 2025-07-03 NOTE — TELEPHONE ENCOUNTER
EMLLISA patient wife. Went over recommendations and restrictions. Patient wife asked for a letter to be sent via [employer] so it may be shared as it is late on Thursday afternoon.

## 2025-07-10 ENCOUNTER — LAB (OUTPATIENT)
Facility: HOSPITAL | Age: 55
End: 2025-07-10
Payer: COMMERCIAL

## 2025-07-10 DIAGNOSIS — I25.10 CORONARY ARTERY DISEASE INVOLVING NATIVE CORONARY ARTERY OF NATIVE HEART WITHOUT ANGINA PECTORIS: ICD-10-CM

## 2025-07-10 DIAGNOSIS — I50.20 HFREF (HEART FAILURE WITH REDUCED EJECTION FRACTION): ICD-10-CM

## 2025-07-10 LAB
ALBUMIN SERPL-MCNC: 4.5 G/DL (ref 3.5–5.2)
ALBUMIN/GLOB SERPL: 1.4 G/DL
ALP SERPL-CCNC: 86 U/L (ref 39–117)
ALT SERPL W P-5'-P-CCNC: 15 U/L (ref 1–41)
ANION GAP SERPL CALCULATED.3IONS-SCNC: 12 MMOL/L (ref 5–15)
AST SERPL-CCNC: 16 U/L (ref 1–40)
BILIRUB SERPL-MCNC: 0.5 MG/DL (ref 0–1.2)
BUN SERPL-MCNC: 10 MG/DL (ref 6–20)
BUN/CREAT SERPL: 11.4 (ref 7–25)
CALCIUM SPEC-SCNC: 9.5 MG/DL (ref 8.6–10.5)
CHLORIDE SERPL-SCNC: 100 MMOL/L (ref 98–107)
CHOLEST SERPL-MCNC: 131 MG/DL (ref 0–200)
CO2 SERPL-SCNC: 24 MMOL/L (ref 22–29)
CREAT SERPL-MCNC: 0.88 MG/DL (ref 0.76–1.27)
DEPRECATED RDW RBC AUTO: 48.1 FL (ref 37–54)
EGFRCR SERPLBLD CKD-EPI 2021: 102.8 ML/MIN/1.73
ERYTHROCYTE [DISTWIDTH] IN BLOOD BY AUTOMATED COUNT: 20.2 % (ref 12.3–15.4)
GLOBULIN UR ELPH-MCNC: 3.3 GM/DL
GLUCOSE SERPL-MCNC: 167 MG/DL (ref 65–99)
HCT VFR BLD AUTO: 36.7 % (ref 37.5–51)
HDLC SERPL-MCNC: 43 MG/DL (ref 40–60)
HGB BLD-MCNC: 11.3 G/DL (ref 13–17.7)
LDLC SERPL CALC-MCNC: 76 MG/DL (ref 0–100)
LDLC/HDLC SERPL: 1.78 {RATIO}
MCH RBC QN AUTO: 21.2 PG (ref 26.6–33)
MCHC RBC AUTO-ENTMCNC: 30.8 G/DL (ref 31.5–35.7)
MCV RBC AUTO: 69 FL (ref 79–97)
PLATELET # BLD AUTO: 190 10*3/MM3 (ref 140–450)
POTASSIUM SERPL-SCNC: 4.4 MMOL/L (ref 3.5–5.2)
PROT SERPL-MCNC: 7.8 G/DL (ref 6–8.5)
RBC # BLD AUTO: 5.32 10*6/MM3 (ref 4.14–5.8)
SODIUM SERPL-SCNC: 136 MMOL/L (ref 136–145)
TRIGL SERPL-MCNC: 57 MG/DL (ref 0–150)
VLDLC SERPL-MCNC: 12 MG/DL (ref 5–40)
WBC NRBC COR # BLD AUTO: 14.94 10*3/MM3 (ref 3.4–10.8)

## 2025-07-10 PROCEDURE — 85027 COMPLETE CBC AUTOMATED: CPT

## 2025-07-10 PROCEDURE — 80061 LIPID PANEL: CPT

## 2025-07-10 PROCEDURE — 80053 COMPREHEN METABOLIC PANEL: CPT

## 2025-07-10 PROCEDURE — 36415 COLL VENOUS BLD VENIPUNCTURE: CPT

## 2025-08-14 ENCOUNTER — OFFICE VISIT (OUTPATIENT)
Dept: CARDIOLOGY | Facility: CLINIC | Age: 55
End: 2025-08-14
Payer: COMMERCIAL

## 2025-08-14 VITALS
BODY MASS INDEX: 28.51 KG/M2 | HEART RATE: 90 BPM | WEIGHT: 182 LBS | DIASTOLIC BLOOD PRESSURE: 73 MMHG | SYSTOLIC BLOOD PRESSURE: 141 MMHG

## 2025-08-14 DIAGNOSIS — E78.2 MIXED HYPERLIPIDEMIA: ICD-10-CM

## 2025-08-14 DIAGNOSIS — Z86.79 HISTORY OF VENTRICULAR FIBRILLATION: ICD-10-CM

## 2025-08-14 DIAGNOSIS — I50.20 HFREF (HEART FAILURE WITH REDUCED EJECTION FRACTION): Primary | ICD-10-CM

## 2025-08-14 DIAGNOSIS — I25.10 CORONARY ARTERY DISEASE INVOLVING NATIVE CORONARY ARTERY OF NATIVE HEART WITHOUT ANGINA PECTORIS: ICD-10-CM

## 2025-08-14 DIAGNOSIS — Z95.5 S/P CORONARY ARTERY STENT PLACEMENT: ICD-10-CM

## 2025-08-14 PROCEDURE — 99214 OFFICE O/P EST MOD 30 MIN: CPT | Performed by: INTERNAL MEDICINE

## 2025-08-14 RX ORDER — SACUBITRIL AND VALSARTAN 49; 51 MG/1; MG/1
1 TABLET, FILM COATED ORAL 2 TIMES DAILY
Qty: 60 TABLET | Refills: 5 | Status: SHIPPED | OUTPATIENT
Start: 2025-08-14

## 2025-08-18 ENCOUNTER — TELEPHONE (OUTPATIENT)
Dept: CARDIOLOGY | Facility: CLINIC | Age: 55
End: 2025-08-18
Payer: COMMERCIAL

## 2025-08-18 ENCOUNTER — OFFICE VISIT (OUTPATIENT)
Dept: CARDIAC REHAB | Facility: HOSPITAL | Age: 55
End: 2025-08-18
Payer: COMMERCIAL

## 2025-08-18 VITALS
OXYGEN SATURATION: 99 % | WEIGHT: 180.12 LBS | BODY MASS INDEX: 28.21 KG/M2 | HEART RATE: 82 BPM | DIASTOLIC BLOOD PRESSURE: 80 MMHG | SYSTOLIC BLOOD PRESSURE: 118 MMHG

## 2025-08-18 DIAGNOSIS — Z95.5 S/P CORONARY ARTERY STENT PLACEMENT: Primary | ICD-10-CM

## 2025-08-18 PROCEDURE — 93798 PHYS/QHP OP CAR RHAB W/ECG: CPT

## 2025-08-20 ENCOUNTER — TREATMENT (OUTPATIENT)
Dept: CARDIAC REHAB | Facility: HOSPITAL | Age: 55
End: 2025-08-20
Payer: COMMERCIAL

## 2025-08-20 DIAGNOSIS — Z95.5 S/P CORONARY ARTERY STENT PLACEMENT: Primary | ICD-10-CM

## 2025-08-20 PROCEDURE — 93798 PHYS/QHP OP CAR RHAB W/ECG: CPT

## 2025-08-22 ENCOUNTER — TREATMENT (OUTPATIENT)
Dept: CARDIAC REHAB | Facility: HOSPITAL | Age: 55
End: 2025-08-22
Payer: COMMERCIAL

## 2025-08-22 DIAGNOSIS — Z95.5 S/P CORONARY ARTERY STENT PLACEMENT: Primary | ICD-10-CM

## 2025-08-22 PROCEDURE — 93798 PHYS/QHP OP CAR RHAB W/ECG: CPT

## 2025-08-25 ENCOUNTER — TREATMENT (OUTPATIENT)
Dept: CARDIAC REHAB | Facility: HOSPITAL | Age: 55
End: 2025-08-25
Payer: COMMERCIAL

## 2025-08-25 DIAGNOSIS — Z95.5 S/P CORONARY ARTERY STENT PLACEMENT: Primary | ICD-10-CM

## 2025-08-25 PROCEDURE — 93798 PHYS/QHP OP CAR RHAB W/ECG: CPT

## (undated) DEVICE — GW FC FLOP/TP .035 260CM 3MM

## (undated) DEVICE — Device: Brand: DEFENDO AIR/WATER/SUCTION AND BIOPSY VALVE

## (undated) DEVICE — SOL IRR NACL 0.9PCT BT 1000ML

## (undated) DEVICE — GLIDESHEATH SLENDER STAINLESS STEEL KIT: Brand: GLIDESHEATH SLENDER

## (undated) DEVICE — TREK CORONARY DILATATION CATHETER 3.0 MM X 12 MM / RAPID-EXCHANGE: Brand: TREK

## (undated) DEVICE — SOL IRRG H2O PL/BG 1000ML STRL

## (undated) DEVICE — BALN PRESS WEDGE 5F 110CM

## (undated) DEVICE — MINI TREK CORONARY DILATATION CATHETER 2.0 MM X 20 MM / RAPID-EXCHANGE: Brand: MINI TREK

## (undated) DEVICE — CATH GUIDE LAUNCHER EBU3.5 6F 100CM

## (undated) DEVICE — CATH F6 ST PIG 155 110CM 6SH: Brand: SUPER TORQUE

## (undated) DEVICE — CATH F6 ST JL 3.5 100CM: Brand: SUPERTORQUE

## (undated) DEVICE — COLON KIT: Brand: MEDLINE INDUSTRIES, INC.

## (undated) DEVICE — RADIFOCUS GLIDEWIRE: Brand: GLIDEWIRE

## (undated) DEVICE — HEARTRAIL III GUIDING CATHETER: Brand: HEARTRAIL

## (undated) DEVICE — CATH F6 ST JR 3.5 100CM: Brand: SUPERTORQUE

## (undated) DEVICE — VBT GC 6F .070 JR 3.5 100CM: Brand: VISTA BRITE TIP

## (undated) DEVICE — RUNTHROUGH NS EXTRA FLOPPY PTCA GUIDEWIRE: Brand: RUNTHROUGH

## (undated) DEVICE — NC TREK NEO™ CORONARY DILATATION CATHETER 3.25 MM X 15 MM / RAPID-EXCHANGE: Brand: NC TREK NEO™